# Patient Record
Sex: FEMALE | Race: WHITE | Employment: FULL TIME | URBAN - METROPOLITAN AREA
[De-identification: names, ages, dates, MRNs, and addresses within clinical notes are randomized per-mention and may not be internally consistent; named-entity substitution may affect disease eponyms.]

---

## 2017-05-30 ENCOUNTER — APPOINTMENT (OUTPATIENT)
Dept: GENERAL RADIOLOGY | Age: 52
End: 2017-05-30
Payer: COMMERCIAL

## 2017-05-30 ENCOUNTER — HOSPITAL ENCOUNTER (EMERGENCY)
Age: 52
Discharge: HOME | End: 2017-05-30
Attending: EMERGENCY MEDICINE
Payer: COMMERCIAL

## 2017-05-30 VITALS
DIASTOLIC BLOOD PRESSURE: 78 MMHG | TEMPERATURE: 98.7 F | BODY MASS INDEX: 16.22 KG/M2 | RESPIRATION RATE: 16 BRPM | SYSTOLIC BLOOD PRESSURE: 144 MMHG | HEIGHT: 68 IN | OXYGEN SATURATION: 99 % | WEIGHT: 107 LBS | HEART RATE: 92 BPM

## 2017-05-30 DIAGNOSIS — M79.642 HAND PAIN, LEFT: ICD-10-CM

## 2017-05-30 DIAGNOSIS — S61.512A WRIST LACERATION, LEFT, INITIAL ENCOUNTER: Primary | ICD-10-CM

## 2017-05-30 PROCEDURE — 74011250636 HC RX REV CODE- 250/636: Performed by: PHYSICIAN ASSISTANT

## 2017-05-30 PROCEDURE — 74011250637 HC RX REV CODE- 250/637: Performed by: PHYSICIAN ASSISTANT

## 2017-05-30 PROCEDURE — 73130 X-RAY EXAM OF HAND: CPT

## 2017-05-30 PROCEDURE — 99283 EMERGENCY DEPT VISIT LOW MDM: CPT | Performed by: PHYSICIAN ASSISTANT

## 2017-05-30 PROCEDURE — 75810000293 HC SIMP/SUPERF WND  RPR: Performed by: PHYSICIAN ASSISTANT

## 2017-05-30 PROCEDURE — 77030002916 HC SUT ETHLN J&J -A

## 2017-05-30 RX ORDER — OXYCODONE AND ACETAMINOPHEN 5; 325 MG/1; MG/1
1 TABLET ORAL
Status: COMPLETED | OUTPATIENT
Start: 2017-05-30 | End: 2017-05-30

## 2017-05-30 RX ORDER — CEPHALEXIN 500 MG/1
500 CAPSULE ORAL 3 TIMES DAILY
Qty: 30 CAP | Refills: 0 | Status: SHIPPED | OUTPATIENT
Start: 2017-05-30 | End: 2019-12-13 | Stop reason: SDUPTHER

## 2017-05-30 RX ORDER — HYDROCODONE BITARTRATE AND ACETAMINOPHEN 5; 325 MG/1; MG/1
1 TABLET ORAL
Qty: 12 TAB | Refills: 0 | Status: SHIPPED | OUTPATIENT
Start: 2017-05-30 | End: 2017-10-10

## 2017-05-30 RX ORDER — HYDROMORPHONE HYDROCHLORIDE 1 MG/ML
1 INJECTION, SOLUTION INTRAMUSCULAR; INTRAVENOUS; SUBCUTANEOUS
Status: COMPLETED | OUTPATIENT
Start: 2017-05-30 | End: 2017-05-30

## 2017-05-30 RX ORDER — IBUPROFEN 600 MG/1
600 TABLET ORAL
Qty: 20 TAB | Refills: 0 | Status: SHIPPED | OUTPATIENT
Start: 2017-05-30 | End: 2017-10-10

## 2017-05-30 RX ADMIN — OXYCODONE HYDROCHLORIDE AND ACETAMINOPHEN 1 TABLET: 5; 325 TABLET ORAL at 20:56

## 2017-05-30 RX ADMIN — HYDROMORPHONE HYDROCHLORIDE 1 MG: 1 INJECTION, SOLUTION INTRAMUSCULAR; INTRAVENOUS; SUBCUTANEOUS at 22:22

## 2017-05-30 NOTE — ED NOTES
\"I ordered some new silverware and I was using a knife to get the zip tie off and it slipped and got my wrist.  I got blood everywhere\"

## 2017-05-31 NOTE — DISCHARGE INSTRUCTIONS
Keep bandage on left wrist x 2 days at which point you will need a wound check. It is also very important that you follow up with the hand surgeon as you likely have an injury to the muscle of your left hand. Keep left hand elevated as much as possible. If you should have any change in your symptoms, worsening symptoms, or concerns return to the ER           Hand Pain: Care Instructions  Your Care Instructions  Common causes of hand pain are overuse and injuries, such as might happen during sports or home repair projects. Everyday wear and tear, especially as you get older, also can cause hand pain. Most minor hand injuries will heal on their own, and home treatment is usually all you need to do. If you have sudden and severe pain, you may need tests and treatment. Follow-up care is a key part of your treatment and safety. Be sure to make and go to all appointments, and call your doctor if you are having problems. Its also a good idea to know your test results and keep a list of the medicines you take. How can you care for yourself at home? · Take pain medicines exactly as directed. ¨ If the doctor gave you a prescription medicine for pain, take it as prescribed. ¨ If you are not taking a prescription pain medicine, ask your doctor if you can take an over-the-counter medicine. · Rest and protect your hand. Take a break from any activity that may cause pain. · Put ice or a cold pack on your hand for 10 to 20 minutes at a time. Put a thin cloth between the ice and your skin. · Prop up the sore hand on a pillow when you ice it or anytime you sit or lie down during the next 3 days. Try to keep it above the level of your heart. This will help reduce swelling. · If your doctor recommends a sling, splint, or elastic bandage to support your hand, wear it as directed. When should you call for help? Call 911 anytime you think you may need emergency care.  For example, call if:  · Your hand turns cool or pale or changes color. Call your doctor now or seek immediate medical care if:  · You cannot move your hand. · Your hand pops, moves out of its normal position, and then returns to its normal position. · You have signs of infection, such as:  ¨ Increased pain, swelling, warmth, or redness. ¨ Red streaks leading from the sore area. ¨ Pus draining from a place on your hand. ¨ A fever. · Your hand feels numb or tingly. Watch closely for changes in your health, and be sure to contact your doctor if:  · Your hand feels unstable when you try to use it. · You do not get better as expected. · You have any new symptoms, such as swelling. · Bruises from an injury to your hand last longer than 2 weeks. Where can you learn more? Go to http://josé miguel-awilda.info/. Enter R273 in the search box to learn more about \"Hand Pain: Care Instructions. \"  Current as of: May 27, 2016  Content Version: 11.2  © 4882-6865 [employer]. Care instructions adapted under license by [employer] (which disclaims liability or warranty for this information). If you have questions about a medical condition or this instruction, always ask your healthcare professional. [place] [address] any warranty or liability for your use of this information. Cuts Closed With Stitches: Care Instructions  Your Care Instructions  A cut can happen anywhere on your body. The doctor used stitches to close the cut. Using stitches also helps the cut heal and reduces scarring. Sometimes pieces of tape called Steri-Strips are put over the stitches. If the cut went deep and through the skin, the doctor may have put in two layers of stitches. The deeper layer brings the deep part of the cut together. These stitches will dissolve and don't need to be removed. The stitches in the upper layer are the ones you see on the cut. You will probably have a bandage over the stitches.  You will need to have the stitches removed, usually in 10 to 14 days. The doctor has checked you carefully, but problems can develop later. If you notice any problems or new symptoms, get medical treatment right away. Follow-up care is a key part of your treatment and safety. Be sure to make and go to all appointments, and call your doctor if you are having problems. It's also a good idea to know your test results and keep a list of the medicines you take. How can you care for yourself at home? · Keep the cut dry for the first 24 to 48 hours. After this, you can shower if your doctor okays it. Pat the cut dry. · Don't soak the cut, such as in a bathtub. Your doctor will tell you when it's safe to get the cut wet. · If your doctor told you how to care for your cut, follow your doctor's instructions. If you did not get instructions, follow this general advice:  ¨ After the first 24 to 48 hours, wash around the cut with clean water 2 times a day. Don't use hydrogen peroxide or alcohol, which can slow healing. ¨ You may cover the cut with a thin layer of petroleum jelly, such as Vaseline, and a nonstick bandage. ¨ Apply more petroleum jelly and replace the bandage as needed. · Prop up the sore area on a pillow anytime you sit or lie down during the next 3 days. Try to keep it above the level of your heart. This will help reduce swelling. · Avoid any activity that could cause your cut to reopen. · Do not remove the stitches on your own. Your doctor will tell you when to come back to have the stitches removed. · Leave Steri-Strips on until they fall off. · Be safe with medicines. Read and follow all instructions on the label. ¨ If the doctor gave you a prescription medicine for pain, take it as prescribed. ¨ If you are not taking a prescription pain medicine, ask your doctor if you can take an over-the-counter medicine. When should you call for help?   Call your doctor now or seek immediate medical care if:  · You have new pain, or your pain gets worse. · The skin near the cut is cold or pale or changes color. · You have tingling, weakness, or numbness near the cut. · The cut starts to bleed, and blood soaks through the bandage. Oozing small amounts of blood is normal.  · You have trouble moving the area near the cut. · You have symptoms of infection, such as:  ¨ Increased pain, swelling, warmth, or redness around the cut. ¨ Red streaks leading from the cut. ¨ Pus draining from the cut. ¨ A fever. Watch closely for changes in your health, and be sure to contact your doctor if:  · The cut reopens. · You do not get better as expected. Where can you learn more? Go to http://josé miguel-awilda.info/. Enter R217 in the search box to learn more about \"Cuts Closed With Stitches: Care Instructions. \"  Current as of: May 27, 2016  Content Version: 11.2  © 4012-3496 [employer]. Care instructions adapted under license by [employer] (which disclaims liability or warranty for this information). If you have questions about a medical condition or this instruction, always ask your healthcare professional. [place] [address] any warranty or liability for your use of this information.

## 2017-05-31 NOTE — ED PROVIDER NOTES
HPI Comments: 71-year-old  female presents stating that she was trying to cut out a plastic tie with a sharp new kitchen knife and accidentally jabbed her left wrist.  She states the knife went in and the direction of her left thumb and she has pain to her left thenar eminence as well. Patient states initially wound bled a lot but she states that bleeding has slowed down now. She denies any paresthesias. She is having difficulty moving her left hand which she states is due to pain. Patient is a 46 y.o. female presenting with skin laceration. The history is provided by the patient. Laceration    The incident occurred 1 to 2 hours ago. The laceration is located on the left hand. The laceration is 1 cm in size. The injury mechanism is a clean knife. Foreign body present: no. The pain is at a severity of 7/10. The pain has been constant since onset. Associated symptoms include loss of motion. Pertinent negatives include no numbness, no tingling, no weakness, no coolness and no discoloration. It is unknown when the patient last had a tetanus shot. History reviewed. No pertinent past medical history. Past Surgical History:   Procedure Laterality Date    ENDOSCOPY, COLON, DIAGNOSTIC  2013    normal (Dr. Gabriela Lim)    HX BREAST BIOPSY      fibrocystic '05    HX GYN  2005    hysterectomy ,left ovaries    HX HEENT      tonsilectomy    HARJINDER BIOPSY BREAST STEREOTACTIC Left 11/22/2016         Family History:   Problem Relation Age of Onset    Other Mother      macular degeneration    Cancer Maternal Aunt      breast 45s    Breast Cancer Maternal Aunt 52    Heart Disease Maternal Grandmother      CHF MI at age 61   [facility] Cancer Sister      vaginal cancer/squamous       Social History     Social History    Marital status: SINGLE     Spouse name: N/A    Number of children: N/A    Years of education: N/A     Occupational History    Not on file.      Social History Main Topics    Smoking status: Current Every Day Smoker     Packs/day: 1.00     Types: Cigarettes     Last attempt to quit: 2/8/2017    Smokeless tobacco: Never Used    Alcohol use 0.0 oz/week     0 Standard drinks or equivalent per week      Comment: occasional    Drug use: No    Sexual activity: Not on file     Other Topics Concern    Not on file     Social History Narrative         ALLERGIES: Toradol [ketorolac tromethamine]    Review of Systems   Constitutional: Negative for chills and fever. Gastrointestinal: Negative for nausea and vomiting. Musculoskeletal: Positive for arthralgias. Negative for joint swelling and myalgias. Skin: Positive for wound. Neurological: Negative for tingling, weakness and numbness. Vitals:    05/30/17 1945   BP: 144/79   Pulse: 98   Resp: 17   Temp: 98.7 °F (37.1 °C)   SpO2: 97%   Weight: 48.5 kg (107 lb)   Height: 5' 7.5\" (1.715 m)            Physical Exam   Constitutional: She is oriented to person, place, and time. Vital signs are normal. She appears well-developed and well-nourished. She is active. Non-toxic appearance. She does not appear ill. No distress. Patient is very anxious. Cardiovascular:   Pulses:       Radial pulses are 2+ on the right side, and 2+ on the left side. Musculoskeletal:        Left wrist: She exhibits decreased range of motion, tenderness, swelling and laceration. She exhibits no bony tenderness. Left forearm: Normal.        Arms:       Left hand: She exhibits tenderness and swelling. She exhibits normal range of motion, no bony tenderness, normal capillary refill and no laceration. Normal sensation noted. Normal strength noted. Hands:  Sensation intact to tips of all fingers of left hand. Neurological: She is alert and oriented to person, place, and time. No sensory deficit. Skin: Skin is warm. Psychiatric: Her speech is normal and behavior is normal. Her mood appears anxious. Nursing note and vitals reviewed.        MDM  Number of Diagnoses or Management Options  Hand pain, left: new and requires workup  Wrist laceration, left, initial encounter: new and requires workup  Diagnosis management comments: Imaging studies of left wrist do not reveal any acute bony abnormalities. Wound is repaired see procedure note. Patient is prescribed Keflex, Motrin and Norco.  Patient is also referred to hand surgery for follow-up. Patient is advised to return to the ER with any change in symptoms or worsening of symptoms. Amount and/or Complexity of Data Reviewed  Tests in the radiology section of CPT®: ordered and reviewed    Risk of Complications, Morbidity, and/or Mortality  Presenting problems: low  Diagnostic procedures: low  Management options: moderate    Patient Progress  Patient progress: improved    ED Course       Wound Repair  Date/Time: 5/30/2017 10:10 PM  Performed by: Dea Rendon provider: Maurice Ayon DO  Preparation: skin prepped with Betadine  Pre-procedure re-eval: Immediately prior to the procedure, the patient was reevaluated and found suitable for the planned procedure and any planned medications. Location details: left wrist  Wound length:2.5 cm or less (1.0 cm)  Anesthesia: local infiltration    Anesthesia:  Anesthesia: local infiltration  Local Anesthetic: lidocaine 1% without epinephrine   Anesthetic total: 4 mL  Foreign bodies: no foreign bodies  Irrigation solution: saline  Irrigation method: syringe  Debridement: none  Skin closure: 4-0 nylon  Number of sutures: 1  Technique: simple and interrupted  Approximation: close  Dressing: antibiotic ointment, 4x4 and gauze roll  Patient tolerance: Patient tolerated the procedure well with no immediate complications  My total time at bedside, performing this procedure was 1-15 minutes. XR HAND LT MIN 3 V   Final Result   IMPRESSION:  Normal left hand.                      I discussed the results of all labs, procedures, radiographs, and treatments with the patient and available family. Treatment plan is agreed upon and the patient is ready for discharge. Questions about treatment in the ED and differential diagnosis of presenting condition were answered. Patient was given verbal discharge instructions including, but not limited to, importance of returning to the emergency department for any concern of worsening or continued symptoms. Instructions were given to follow up with a primary care provider or specialist within 1-2 days. Adverse effects of medications, if prescribed, were discussed and patient was advised to refrain from significant physical activity until followed up by primary care physician and to not drive or operate heavy machinery after taking any sedating substances.

## 2017-10-17 ENCOUNTER — HOSPITAL ENCOUNTER (OUTPATIENT)
Dept: GENERAL RADIOLOGY | Age: 52
Discharge: HOME | End: 2017-10-17
Payer: COMMERCIAL

## 2017-10-17 DIAGNOSIS — R63.4 WEIGHT LOSS: ICD-10-CM

## 2017-10-17 PROCEDURE — 71020 XR CHEST PA LAT: CPT

## 2017-11-22 ENCOUNTER — HOSPITAL ENCOUNTER (OUTPATIENT)
Dept: LAB | Age: 52
Discharge: HOME | End: 2017-11-22

## 2017-11-22 PROCEDURE — 88305 TISSUE EXAM BY PATHOLOGIST: CPT | Performed by: INTERNAL MEDICINE

## 2017-12-23 ENCOUNTER — APPOINTMENT (OUTPATIENT)
Dept: GENERAL RADIOLOGY | Age: 52
DRG: 917 | End: 2017-12-23
Attending: EMERGENCY MEDICINE
Payer: SUBSIDIZED

## 2017-12-23 ENCOUNTER — APPOINTMENT (OUTPATIENT)
Dept: CT IMAGING | Age: 52
DRG: 917 | End: 2017-12-23
Attending: EMERGENCY MEDICINE
Payer: SUBSIDIZED

## 2017-12-23 ENCOUNTER — HOSPITAL ENCOUNTER (INPATIENT)
Age: 52
LOS: 4 days | Discharge: HOME | DRG: 917 | End: 2017-12-28
Attending: EMERGENCY MEDICINE | Admitting: INTERNAL MEDICINE
Payer: SUBSIDIZED

## 2017-12-23 DIAGNOSIS — R41.82 ALTERED MENTAL STATUS, UNSPECIFIED ALTERED MENTAL STATUS TYPE: Primary | ICD-10-CM

## 2017-12-23 DIAGNOSIS — T50.902A MEDICATION OVERDOSE, INTENTIONAL SELF-HARM, INITIAL ENCOUNTER (HCC): ICD-10-CM

## 2017-12-23 DIAGNOSIS — F10.929 ALCOHOLIC INTOXICATION WITH COMPLICATION (HCC): ICD-10-CM

## 2017-12-23 LAB
ARTERIAL PATENCY WRIST A: POSITIVE
BASE DEFICIT BLDA-SCNC: 3.8 MMOL/L (ref 0–2)
BDY SITE: ABNORMAL
COHGB MFR BLD: 4.1 % (ref 0.5–1.5)
DO-HGB BLD-MCNC: 1 % (ref 0–5)
FIO2 ON VENT: 60 %
HCO3 BLDA-SCNC: 22 MMOL/L (ref 22–26)
HGB BLDMV-MCNC: 12.7 GM/DL (ref 11.7–15)
METHGB MFR BLD: 0.4 % (ref 0–1.5)
OXYHGB MFR BLDA: 94.7 % (ref 94–97)
PCO2 BLDA: 46 MMHG (ref 35–45)
PEEP RESPIRATORY: 8 CM[H2O]
PH BLDA: 7.31 [PH] (ref 7.35–7.45)
PO2 BLDA: 307 MMHG (ref 80–105)
SAO2 % BLD: 99 % (ref 92–98.5)
SERVICE CMNT-IMP: ABNORMAL
TROPONIN I BLD-MCNC: 0 NG/ML (ref 0.02–0.05)
VENTILATION MODE VENT: ABNORMAL
VT SETTING VENT: 450 ML

## 2017-12-23 PROCEDURE — 96375 TX/PRO/DX INJ NEW DRUG ADDON: CPT | Performed by: EMERGENCY MEDICINE

## 2017-12-23 PROCEDURE — 71010 XR CHEST PORT: CPT

## 2017-12-23 PROCEDURE — 74011250636 HC RX REV CODE- 250/636

## 2017-12-23 PROCEDURE — 99285 EMERGENCY DEPT VISIT HI MDM: CPT | Performed by: EMERGENCY MEDICINE

## 2017-12-23 PROCEDURE — 80307 DRUG TEST PRSMV CHEM ANLYZR: CPT | Performed by: EMERGENCY MEDICINE

## 2017-12-23 PROCEDURE — 77030008771 HC TU NG SALEM SUMP -A

## 2017-12-23 PROCEDURE — 74011250636 HC RX REV CODE- 250/636: Performed by: EMERGENCY MEDICINE

## 2017-12-23 PROCEDURE — 85025 COMPLETE CBC W/AUTO DIFF WBC: CPT | Performed by: EMERGENCY MEDICINE

## 2017-12-23 PROCEDURE — 80053 COMPREHEN METABOLIC PANEL: CPT | Performed by: EMERGENCY MEDICINE

## 2017-12-23 PROCEDURE — 74011000250 HC RX REV CODE- 250: Performed by: EMERGENCY MEDICINE

## 2017-12-23 PROCEDURE — 36600 WITHDRAWAL OF ARTERIAL BLOOD: CPT

## 2017-12-23 PROCEDURE — 83690 ASSAY OF LIPASE: CPT | Performed by: EMERGENCY MEDICINE

## 2017-12-23 PROCEDURE — 94002 VENT MGMT INPAT INIT DAY: CPT

## 2017-12-23 PROCEDURE — 96361 HYDRATE IV INFUSION ADD-ON: CPT | Performed by: EMERGENCY MEDICINE

## 2017-12-23 PROCEDURE — 96365 THER/PROPH/DIAG IV INF INIT: CPT | Performed by: EMERGENCY MEDICINE

## 2017-12-23 PROCEDURE — 31500 INSERT EMERGENCY AIRWAY: CPT | Performed by: EMERGENCY MEDICINE

## 2017-12-23 PROCEDURE — 84484 ASSAY OF TROPONIN QUANT: CPT

## 2017-12-23 PROCEDURE — 5A1935Z RESPIRATORY VENTILATION, LESS THAN 24 CONSECUTIVE HOURS: ICD-10-PCS | Performed by: INTERNAL MEDICINE

## 2017-12-23 PROCEDURE — 93005 ELECTROCARDIOGRAM TRACING: CPT | Performed by: EMERGENCY MEDICINE

## 2017-12-23 PROCEDURE — 82803 BLOOD GASES ANY COMBINATION: CPT

## 2017-12-23 RX ORDER — VECURONIUM BROMIDE FOR INJECTION 1 MG/ML
0.1 INJECTION, POWDER, LYOPHILIZED, FOR SOLUTION INTRAVENOUS
Status: COMPLETED | OUTPATIENT
Start: 2017-12-23 | End: 2017-12-23

## 2017-12-23 RX ORDER — PROPOFOL 10 MG/ML
INJECTION, EMULSION INTRAVENOUS
Status: COMPLETED
Start: 2017-12-23 | End: 2017-12-23

## 2017-12-23 RX ORDER — SUCCINYLCHOLINE CHLORIDE 20 MG/ML
50 INJECTION INTRAMUSCULAR; INTRAVENOUS
Status: COMPLETED | OUTPATIENT
Start: 2017-12-23 | End: 2017-12-23

## 2017-12-23 RX ORDER — SODIUM CHLORIDE 9 MG/ML
1000 INJECTION, SOLUTION INTRAVENOUS CONTINUOUS
Status: DISCONTINUED | OUTPATIENT
Start: 2017-12-23 | End: 2017-12-28 | Stop reason: HOSPADM

## 2017-12-23 RX ORDER — PROPOFOL 10 MG/ML
0-50 VIAL (ML) INTRAVENOUS
Status: DISCONTINUED | OUTPATIENT
Start: 2017-12-23 | End: 2017-12-26

## 2017-12-23 RX ORDER — NALOXONE HYDROCHLORIDE 1 MG/ML
2 INJECTION INTRAMUSCULAR; INTRAVENOUS; SUBCUTANEOUS
Status: COMPLETED | OUTPATIENT
Start: 2017-12-23 | End: 2017-12-23

## 2017-12-23 RX ORDER — PROPOFOL 10 MG/ML
0.5 INJECTION, EMULSION INTRAVENOUS
Status: DISCONTINUED | OUTPATIENT
Start: 2017-12-23 | End: 2017-12-23

## 2017-12-23 RX ORDER — WATER FOR INJECTION,STERILE
VIAL (ML) INJECTION
Status: ACTIVE
Start: 2017-12-23 | End: 2017-12-24

## 2017-12-23 RX ORDER — SUCCINYLCHOLINE CHLORIDE 20 MG/ML
100 INJECTION INTRAMUSCULAR; INTRAVENOUS
Status: COMPLETED | OUTPATIENT
Start: 2017-12-23 | End: 2017-12-23

## 2017-12-23 RX ORDER — NALOXONE HYDROCHLORIDE 1 MG/ML
INJECTION INTRAMUSCULAR; INTRAVENOUS; SUBCUTANEOUS
Status: COMPLETED
Start: 2017-12-23 | End: 2017-12-23

## 2017-12-23 RX ORDER — ETOMIDATE 2 MG/ML
30 INJECTION INTRAVENOUS ONCE
Status: COMPLETED | OUTPATIENT
Start: 2017-12-23 | End: 2017-12-23

## 2017-12-23 RX ADMIN — SUCCINYLCHOLINE CHLORIDE 100 MG: 20 INJECTION, SOLUTION INTRAMUSCULAR; INTRAVENOUS at 22:45

## 2017-12-23 RX ADMIN — ETOMIDATE 30 MG: 2 INJECTION, SOLUTION INTRAVENOUS at 22:43

## 2017-12-23 RX ADMIN — PROPOFOL 10 MCG/KG/MIN: 10 INJECTION, EMULSION INTRAVENOUS at 23:16

## 2017-12-23 RX ADMIN — NALOXONE HYDROCHLORIDE 2 MG: 1 INJECTION INTRAMUSCULAR; INTRAVENOUS; SUBCUTANEOUS at 22:24

## 2017-12-23 RX ADMIN — VECURONIUM BROMIDE 4.45 MG: 10 INJECTION, POWDER, LYOPHILIZED, FOR SOLUTION INTRAVENOUS at 23:32

## 2017-12-23 RX ADMIN — SUCCINYLCHOLINE CHLORIDE 50 MG: 20 INJECTION, SOLUTION INTRAMUSCULAR; INTRAVENOUS at 22:59

## 2017-12-23 RX ADMIN — SODIUM CHLORIDE 1000 ML: 900 INJECTION, SOLUTION INTRAVENOUS at 22:28

## 2017-12-23 RX ADMIN — POISON ADSORBENT 50 G: 50 SUSPENSION ORAL at 23:33

## 2017-12-23 RX ADMIN — NALOXONE HYDROCHLORIDE 2 MG: 1 INJECTION PARENTERAL at 22:24

## 2017-12-23 NOTE — IP AVS SNAPSHOT
[address] [facility], [place] 
 
 
 [address] [facility] [address] [facility] 
[phone] Patient: Aishwarya Strong MRN: [id number] :1965 About your hospitalization You were admitted on:  2017 You last received care in the:  [facility] 8 MED SURG You were discharged on:  2017 Why you were hospitalized Your primary diagnosis was: Altered Mental Status Your diagnoses also included:  Suicidal Overdose (Hcc), Alcohol Intoxication (Hcc), Protein-Calorie Malnutrition, Severe (Hcc) Things You Need To Do (next 8 weeks) Call [facility] EToH/SA treatment  today Where:  Kirk Aguirre [address] [place], [place]  
  
 Call [facility] today  
for f/u Phone:  [phone] Where:  [address] [address] Go to [address] [place] [address] in 3 day(s) Rx; [facility] dental care Phone:  [phone] Where:  Francesca Gorman [place] [address]  Extended Office Visit with Nely Shell MD at  2:30 PM  
Where:  [address] [facility] ([address]) Discharge Orders None A check amada indicates which time of day the medication should be taken. My Medications TAKE these medications as instructed Instructions Each Dose to Equal  
 Morning Noon Evening Bedtime  
 aspirin delayed-release 81 mg tablet Your next dose is:   Take  by mouth daily. ciprofloxacin HCl 500 mg tablet Commonly known as:  CIPRO Your next dose is: This evening Take 1 Tab by mouth two (2) times a day for 7 days. 500 mg  
    
   
   
  
   
  
 mirtazapine 15 mg tablet Commonly known as:  Conssly Bangura Your next dose is:  bedtime Take 1 Tab by mouth nightly. 15 mg  
    
   
   
   
  
  
 nicotine 14 mg/24 hr patch Commonly known as:  Vladimir Pascal  
 Start taking on:  12/29/2017 1 Patch by TransDERmal route daily for 21 days. 1 Patch  
    
   
   
   
  
 zolpidem 10 mg tablet Commonly known as:  AMBIEN Your next dose is:  bedtime Take 1/2 tablet at bedtime as needed Where to Get Your Medications Information on where to get these meds will be given to you by the nurse or doctor. ! Ask your nurse or doctor about these medications  
  ciprofloxacin HCl 500 mg tablet  
 mirtazapine 15 mg tablet  
 nicotine 14 mg/24 hr patch Discharge Instructions DISCHARGE SUMMARY from Nurse PATIENT INSTRUCTIONS: 
 
After general anesthesia or intravenous sedation, for 24 hours or while taking prescription Narcotics: · Limit your activities · Do not drive and operate hazardous machinery · Do not make important personal or business decisions · Do  not drink alcoholic beverages · If you have not urinated within 8 hours after discharge, please contact your surgeon on call. Report the following to your surgeon: 
· Excessive pain, swelling, redness or odor of or around the surgical area · Temperature over 100.5 · Nausea and vomiting lasting longer than 4 hours or if unable to take medications · Any signs of decreased circulation or nerve impairment to extremity: change in color, persistent  numbness, tingling, coldness or increase pain · Any questions What to do at Home: 
Recommended activity: Activity as tolerated If you experience any of the following symptoms fever greater than 101, nausea/vomiting, or pain , please follow up with your primary care physician. *  Please give a list of your current medications to your Primary Care Provider. *  Please update this list whenever your medications are discontinued, doses are 
    changed, or new medications (including over-the-counter products) are added.  
 
*  Please carry medication information at all times in case of emergency situations. These are general instructions for a healthy lifestyle: No smoking/ No tobacco products/ Avoid exposure to second hand smoke Surgeon General's Warning:  Quitting smoking now greatly reduces serious risk to your health. Obesity, smoking, and sedentary lifestyle greatly increases your risk for illness A healthy diet, regular physical exercise & weight monitoring are important for maintaining a healthy lifestyle You may be retaining fluid if you have a history of heart failure or if you experience any of the following symptoms:  Weight gain of 3 pounds or more overnight or 5 pounds in a week, increased swelling in our hands or feet or shortness of breath while lying flat in bed. Please call your doctor as soon as you notice any of these symptoms; do not wait until your next office visit. Recognize signs and symptoms of STROKE: 
 
F-face looks uneven A-arms unable to move or move unevenly S-speech slurred or non-existent T-time-call 911 as soon as signs and symptoms begin-DO NOT go Back to bed or wait to see if you get better-TIME IS BRAIN. Warning Signs of HEART ATTACK Call 911 if you have these symptoms: 
? Chest discomfort. Most heart attacks involve discomfort in the center of the chest that lasts more than a few minutes, or that goes away and comes back. It can feel like uncomfortable pressure, squeezing, fullness, or pain. ? Discomfort in other areas of the upper body. Symptoms can include pain or discomfort in one or both arms, the back, neck, jaw, or stomach. ? Shortness of breath with or without chest discomfort. ? Other signs may include breaking out in a cold sweat, nausea, or lightheadedness. Don't wait more than five minutes to call [address]! Fast action can save your life. Calling 911 is almost always the fastest way to get lifesaving treatment.  Emergency Medical Services staff can begin treatment when they arrive  up to an hour sooner than if someone gets to the hospital by car. The discharge information has been reviewed with the patient. The patient verbalized understanding. Discharge medications reviewed with the patient and appropriate educational materials and side effects teaching were provided. ___________________________________________________________________________________________________________________________________ Altered Mental Status: Care Instructions Your Care Instructions Altered mental status is a change in how well your brain is working. As a result, you may be confused, be less alert than usual, or act in odd ways. This may include seeing or hearing things that aren't really there (hallucinations). A mental status change has many possible causes. For example, it may be the result of an infection, an imbalance of chemicals in the body, or a chronic disease such as diabetes or COPD. It can also be caused by things such as a head injury, taking certain medicines, or using alcohol or drugs. The doctor may do tests to look for the cause. These tests may include urine tests, blood tests, and imaging tests such as a CT scan. Sometimes a clear cause isn't found. But tests can help the doctor rule out a serious cause of your symptoms. A change in mental status can be scary. But mental status will often return to normal when the cause is treated. So it is important to get any follow-up testing or treatment the doctor has suggested. The doctor has checked you carefully, but problems can develop later. If you notice any problems or new symptoms, get medical treatment right away. Follow-up care is a key part of your treatment and safety. Be sure to make and go to all appointments, and call your doctor if you are having problems. It's also a good idea to know your test results and keep a list of the medicines you take. How can you care for yourself at home? · Be safe with medicines. Take your medicines exactly as prescribed. Call your doctor if you think you are having a problem with your medicine. · Have another adult stay with you until you are better. This can help keep you safe. Ask that person to watch for signs that your mental status is getting worse. When should you call for help? Call 911 anytime you think you may need emergency care. For example, call if: 
? · You passed out (lost consciousness). ?Call your doctor now or seek immediate medical care if: 
? · Your mental status is getting worse. ? · You have new symptoms, such as a fever, chills, or shortness of breath. ? · You do not feel safe. ? Watch closely for changes in your health, and be sure to contact your doctor if: 
? · You do not get better as expected. Where can you learn more? Go to http://josé miguelNetbyte Hostingawilda.info/. Enter S037 in the search box to learn more about \"Altered Mental Status: Care Instructions. \" Current as of: October 14, 2016 Content Version: 11.4 © 2153-9119 [employer]. Care instructions adapted under license by [employer] (which disclaims liability or warranty for this information). If you have questions about a medical condition or this instruction, always ask your healthcare professional. [address] any warranty or liability for your use of this information. [employer]X Announcement We are excited to announce that we are making your provider's discharge notes available to you in PureWRX. You will see these notes when they are completed and signed by the physician that discharged you from your recent hospital stay. If you have any questions or concerns about any information you see in PureWRX, please call the Health Information Department where you were seen or reach out to your Primary Care Provider for more information about your plan of care. Introducing [facility] SERVICES! [employer] introduces Artsicle patient portal. Now you can access parts of your medical record, email your doctor's office, and request medication refills online. 1. In your internet browser, go to [email or web address] 2. Click on the First Time User? Click Here link in the Sign In box. You will see the New Member Sign Up page. 3. Enter your Artsicle Access Code exactly as it appears below. You will not need to use this code after youve completed the sign-up process. If you do not sign up before the expiration date, you must request a new code. · Artsicle Access Code: [email or web address] Expires: 1/8/2018  2:10 PM 
 
4. Enter the last four digits of your Social Security Number (xxxx) and Date of Birth (mm/dd/yyyy) as indicated and click Submit. You will be taken to the next sign-up page. 5. Create a Artsicle ID. This will be your Artsicle login ID and cannot be changed, so think of one that is secure and easy to remember. 6. Create a Artsicle password. You can change your password at any time. 7. Enter your Password Reset Question and Answer. This can be used at a later time if you forget your password. 8. Enter your e-mail address. You will receive e-mail notification when new information is available in [address] 9. Click Sign Up. You can now view and download portions of your medical record. 10. Click the Download Summary menu link to download a portable copy of your medical information. If you have questions, please visit the Frequently Asked Questions section of the Artsicle website. Remember, Artsicle is NOT to be used for urgent needs. For medical emergencies, dial 911. Now available from your iPhone and Android! Unresulted Labs-Please follow up with your PCP about these lab tests Order Current Status CULTURE, BLOOD Preliminary result CULTURE, BLOOD Preliminary result Providers Seen During Your Hospitalization Provider Specialty Primary office phone Mirian Contreras MD Emergency Medicine [phone] Tolu Singleton MD Pulmonary Disease [phone] Aris Aldrich MD Internal Medicine [phone] Immunizations Administered for This Admission Name Date Influenza Vaccine (Quad) PF 12/28/2017 Your Primary Care Physician (PCP) Primary Care Physician Office Phone Office Fax [address] Landy Smith, Adalberto1 Ishan Dooley omar. [phone] You are allergic to the following Allergen Reactions Effexor (Venlafaxine) Other (comments) Too much\" could not fcn Toradol (Ketorolac Tromethamine) Itching Recent Documentation Height Weight BMI OB Status Smoking Status 1.715 m 44.4 kg 15.11 kg/m2 Hysterectomy Current Some Day Smoker Emergency Contacts Name Discharge Info Relation Home Work Mobile Abbey Newby  Mother [14] [phone] Abel Bush  Boyfriend [17] [phone] Patient Belongings The following personal items are in your possession at time of discharge: 
  Dental Appliances: Partials, Uppers  Visual Aid: Glasses, With patient Please provide this summary of care documentation to your next provider. Signatures-by signing, you are acknowledging that this After Visit Summary has been reviewed with you and you have received a copy. Patient Signature:  ____________________________________________________________ Date:  ____________________________________________________________  
  
Leia Traylor Provider Signature:  ____________________________________________________________ Date:  ____________________________________________________________

## 2017-12-24 ENCOUNTER — APPOINTMENT (OUTPATIENT)
Dept: CT IMAGING | Age: 52
DRG: 917 | End: 2017-12-24
Attending: EMERGENCY MEDICINE
Payer: SUBSIDIZED

## 2017-12-24 ENCOUNTER — APPOINTMENT (OUTPATIENT)
Dept: CT IMAGING | Age: 52
DRG: 917 | End: 2017-12-24
Attending: INTERNAL MEDICINE
Payer: SUBSIDIZED

## 2017-12-24 PROBLEM — F10.929 ALCOHOL INTOXICATION (HCC): Status: ACTIVE | Noted: 2017-12-24

## 2017-12-24 PROBLEM — T50.902A SUICIDAL OVERDOSE (HCC): Status: ACTIVE | Noted: 2017-12-24

## 2017-12-24 PROBLEM — R41.82 ALTERED MENTAL STATUS: Status: ACTIVE | Noted: 2017-12-24

## 2017-12-24 LAB
ALBUMIN SERPL-MCNC: 3.3 G/DL (ref 3.5–5)
ALBUMIN/GLOB SERPL: 1 {RATIO} (ref 1.2–3.5)
ALP SERPL-CCNC: 75 U/L (ref 50–136)
ALT SERPL-CCNC: 18 U/L (ref 12–65)
AMPHET UR QL SCN: NEGATIVE
ANION GAP SERPL CALC-SCNC: 9 MMOL/L (ref 7–16)
APAP SERPL-MCNC: <2 UG/ML (ref 10–30)
ARTERIAL PATENCY WRIST A: POSITIVE
AST SERPL-CCNC: 13 U/L (ref 15–37)
ATRIAL RATE: 74 BPM
BARBITURATES UR QL SCN: NEGATIVE
BASE EXCESS BLDA CALC-SCNC: 0 MMOL/L (ref 0–3)
BASOPHILS # BLD: 0 K/UL (ref 0–0.2)
BASOPHILS NFR BLD: 0 % (ref 0–2)
BDY SITE: ABNORMAL
BENZODIAZ UR QL: NEGATIVE
BILIRUB SERPL-MCNC: 0.3 MG/DL (ref 0.2–1.1)
BUN SERPL-MCNC: 7 MG/DL (ref 6–23)
CALCIUM SERPL-MCNC: 7.5 MG/DL (ref 8.3–10.4)
CALCULATED P AXIS, ECG09: 86 DEGREES
CALCULATED R AXIS, ECG10: 92 DEGREES
CALCULATED T AXIS, ECG11: 77 DEGREES
CANNABINOIDS UR QL SCN: NEGATIVE
CHLORIDE SERPL-SCNC: 110 MMOL/L (ref 98–107)
CO2 SERPL-SCNC: 25 MMOL/L (ref 21–32)
COCAINE UR QL SCN: NEGATIVE
COHGB MFR BLD: 1.5 % (ref 0.5–1.5)
CREAT SERPL-MCNC: 0.41 MG/DL (ref 0.6–1)
DIAGNOSIS, 93000: NORMAL
DIFFERENTIAL METHOD BLD: ABNORMAL
DO-HGB BLD-MCNC: 1 % (ref 0–5)
EOSINOPHIL # BLD: 0.1 K/UL (ref 0–0.8)
EOSINOPHIL NFR BLD: 2 % (ref 0.5–7.8)
ERYTHROCYTE [DISTWIDTH] IN BLOOD BY AUTOMATED COUNT: 12.4 % (ref 11.9–14.6)
ETHANOL SERPL-MCNC: 198 MG/DL
FIO2 ON VENT: 60 %
GLOBULIN SER CALC-MCNC: 3.3 G/DL (ref 2.3–3.5)
GLUCOSE SERPL-MCNC: 97 MG/DL (ref 65–100)
HCO3 BLDA-SCNC: 26 MMOL/L (ref 22–26)
HCT VFR BLD AUTO: 35.3 % (ref 35.8–46.3)
HGB BLD-MCNC: 12 G/DL (ref 11.7–15.4)
HGB BLDMV-MCNC: 13.2 GM/DL (ref 11.7–15)
IMM GRANULOCYTES # BLD: 0 K/UL (ref 0–0.5)
IMM GRANULOCYTES NFR BLD AUTO: 0 % (ref 0–5)
LIPASE SERPL-CCNC: 300 U/L (ref 73–393)
LYMPHOCYTES # BLD: 2.1 K/UL (ref 0.5–4.6)
LYMPHOCYTES NFR BLD: 26 % (ref 13–44)
MCH RBC QN AUTO: 32.8 PG (ref 26.1–32.9)
MCHC RBC AUTO-ENTMCNC: 34 G/DL (ref 31.4–35)
MCV RBC AUTO: 96.4 FL (ref 79.6–97.8)
METHADONE UR QL: NEGATIVE
METHGB MFR BLD: 0.7 % (ref 0–1.5)
MONOCYTES # BLD: 0.9 K/UL (ref 0.1–1.3)
MONOCYTES NFR BLD: 10 % (ref 4–12)
NEUTS SEG # BLD: 5.1 K/UL (ref 1.7–8.2)
NEUTS SEG NFR BLD: 62 % (ref 43–78)
OPIATES UR QL: NEGATIVE
OXYHGB MFR BLDA: 96.8 % (ref 94–97)
P-R INTERVAL, ECG05: 166 MS
PCO2 BLDA: 45 MMHG (ref 35–45)
PCP UR QL: NEGATIVE
PH BLDA: 7.37 [PH] (ref 7.35–7.45)
PLATELET # BLD AUTO: 270 K/UL (ref 150–450)
PMV BLD AUTO: 10.2 FL (ref 10.8–14.1)
PO2 BLDA: 159 MMHG (ref 80–105)
POTASSIUM SERPL-SCNC: 3.8 MMOL/L (ref 3.5–5.1)
PROT SERPL-MCNC: 6.6 G/DL (ref 6.3–8.2)
Q-T INTERVAL, ECG07: 418 MS
QRS DURATION, ECG06: 74 MS
QTC CALCULATION (BEZET), ECG08: 463 MS
RBC # BLD AUTO: 3.66 M/UL (ref 4.05–5.25)
SALICYLATES SERPL-MCNC: 3.4 MG/DL (ref 2.8–20)
SAO2 % BLD: 99 % (ref 92–98.5)
SODIUM SERPL-SCNC: 144 MMOL/L (ref 136–145)
VENTRICULAR RATE, ECG03: 74 BPM
WBC # BLD AUTO: 8.2 K/UL (ref 4.3–11.1)

## 2017-12-24 PROCEDURE — 87186 SC STD MICRODIL/AGAR DIL: CPT | Performed by: INTERNAL MEDICINE

## 2017-12-24 PROCEDURE — 36600 WITHDRAWAL OF ARTERIAL BLOOD: CPT

## 2017-12-24 PROCEDURE — 87086 URINE CULTURE/COLONY COUNT: CPT | Performed by: INTERNAL MEDICINE

## 2017-12-24 PROCEDURE — 36415 COLL VENOUS BLD VENIPUNCTURE: CPT | Performed by: INTERNAL MEDICINE

## 2017-12-24 PROCEDURE — 65270000029 HC RM PRIVATE

## 2017-12-24 PROCEDURE — 87040 BLOOD CULTURE FOR BACTERIA: CPT | Performed by: INTERNAL MEDICINE

## 2017-12-24 PROCEDURE — 70450 CT HEAD/BRAIN W/O DYE: CPT

## 2017-12-24 PROCEDURE — 80307 DRUG TEST PRSMV CHEM ANLYZR: CPT | Performed by: EMERGENCY MEDICINE

## 2017-12-24 PROCEDURE — 96366 THER/PROPH/DIAG IV INF ADDON: CPT | Performed by: EMERGENCY MEDICINE

## 2017-12-24 PROCEDURE — 77030032490 HC SLV COMPR SCD KNE COVD -B

## 2017-12-24 PROCEDURE — 87088 URINE BACTERIA CULTURE: CPT | Performed by: INTERNAL MEDICINE

## 2017-12-24 PROCEDURE — 74011000250 HC RX REV CODE- 250: Performed by: INTERNAL MEDICINE

## 2017-12-24 PROCEDURE — 74011250636 HC RX REV CODE- 250/636: Performed by: EMERGENCY MEDICINE

## 2017-12-24 PROCEDURE — 82803 BLOOD GASES ANY COMBINATION: CPT

## 2017-12-24 PROCEDURE — 74011250637 HC RX REV CODE- 250/637: Performed by: INTERNAL MEDICINE

## 2017-12-24 PROCEDURE — 74011250636 HC RX REV CODE- 250/636: Performed by: INTERNAL MEDICINE

## 2017-12-24 RX ORDER — SODIUM CHLORIDE 9 MG/ML
100 INJECTION, SOLUTION INTRAVENOUS CONTINUOUS
Status: DISCONTINUED | OUTPATIENT
Start: 2017-12-24 | End: 2017-12-28 | Stop reason: HOSPADM

## 2017-12-24 RX ORDER — ACETAMINOPHEN 325 MG/1
650 TABLET ORAL
Status: DISCONTINUED | OUTPATIENT
Start: 2017-12-24 | End: 2017-12-25

## 2017-12-24 RX ORDER — PROPOFOL 10 MG/ML
0.5 INJECTION, EMULSION INTRAVENOUS
Status: COMPLETED | OUTPATIENT
Start: 2017-12-24 | End: 2017-12-24

## 2017-12-24 RX ORDER — IBUPROFEN 200 MG
1 TABLET ORAL DAILY
Status: COMPLETED | OUTPATIENT
Start: 2017-12-24 | End: 2017-12-27

## 2017-12-24 RX ORDER — CHLORHEXIDINE GLUCONATE 1.2 MG/ML
15 RINSE ORAL EVERY 12 HOURS
Status: DISCONTINUED | OUTPATIENT
Start: 2017-12-24 | End: 2017-12-24

## 2017-12-24 RX ORDER — SODIUM CHLORIDE 0.9 % (FLUSH) 0.9 %
5-10 SYRINGE (ML) INJECTION EVERY 8 HOURS
Status: DISCONTINUED | OUTPATIENT
Start: 2017-12-24 | End: 2017-12-28 | Stop reason: HOSPADM

## 2017-12-24 RX ORDER — SODIUM CHLORIDE 0.9 % (FLUSH) 0.9 %
5-10 SYRINGE (ML) INJECTION AS NEEDED
Status: DISCONTINUED | OUTPATIENT
Start: 2017-12-24 | End: 2017-12-28 | Stop reason: HOSPADM

## 2017-12-24 RX ORDER — ZOLPIDEM TARTRATE 5 MG/1
5 TABLET ORAL
Status: DISCONTINUED | OUTPATIENT
Start: 2017-12-24 | End: 2017-12-28 | Stop reason: HOSPADM

## 2017-12-24 RX ORDER — FENTANYL CITRATE-0.9 % NACL/PF 25 MCG/ML
0-200 PLASTIC BAG, INJECTION (ML) INJECTION
Status: DISCONTINUED | OUTPATIENT
Start: 2017-12-24 | End: 2017-12-26

## 2017-12-24 RX ORDER — ENOXAPARIN SODIUM 100 MG/ML
30 INJECTION SUBCUTANEOUS EVERY 24 HOURS
Status: DISCONTINUED | OUTPATIENT
Start: 2017-12-24 | End: 2017-12-28 | Stop reason: HOSPADM

## 2017-12-24 RX ADMIN — ACETAMINOPHEN 650 MG: 325 TABLET ORAL at 19:57

## 2017-12-24 RX ADMIN — Medication 5 ML: at 15:03

## 2017-12-24 RX ADMIN — PROPOFOL 22.3 MG: 10 INJECTION, EMULSION INTRAVENOUS at 01:44

## 2017-12-24 RX ADMIN — SODIUM CHLORIDE 100 ML/HR: 900 INJECTION, SOLUTION INTRAVENOUS at 14:57

## 2017-12-24 RX ADMIN — SODIUM CHLORIDE 100 ML/HR: 900 INJECTION, SOLUTION INTRAVENOUS at 03:39

## 2017-12-24 RX ADMIN — ZOLPIDEM TARTRATE 5 MG: 5 TABLET ORAL at 23:51

## 2017-12-24 RX ADMIN — Medication 5 ML: at 21:20

## 2017-12-24 RX ADMIN — FOLIC ACID: 5 INJECTION, SOLUTION INTRAMUSCULAR; INTRAVENOUS; SUBCUTANEOUS at 03:51

## 2017-12-24 RX ADMIN — PROPOFOL 30 MCG/KG/MIN: 10 INJECTION, EMULSION INTRAVENOUS at 05:57

## 2017-12-24 RX ADMIN — Medication 1 AMPULE: at 21:19

## 2017-12-24 RX ADMIN — Medication 1 AMPULE: at 09:13

## 2017-12-24 RX ADMIN — ENOXAPARIN SODIUM 30 MG: 30 INJECTION SUBCUTANEOUS at 09:13

## 2017-12-24 RX ADMIN — Medication 50 MCG/HR: at 03:26

## 2017-12-24 NOTE — PROGRESS NOTES
Respiratory Mechanics completed and are as follows:  Weaning Parameters  Spontaneous Breathing Trial Complete:  (PS for 10 minutes eyes open & writing on tablet)  Resp Rate Observed: 18  Ve: 8.1  VT: 450  RSBI: 25  NIF: -25  VC: 890  Genao Agitation Sedation Scale (RASS): Alert and calm  Patient extubated to a 2L NC. Patient is able to communicate and is negative for stridor. Breath sounds are coarse. No complications with extubation.      Nayana Adkins

## 2017-12-24 NOTE — ED TRIAGE NOTES
ambien and white wine. Withdraws to pain. No resp. Distress noted. Moved to room 3 for  BP 69/44  Dr. La Lopez at bedside.

## 2017-12-24 NOTE — PROGRESS NOTES
Admission skin assessment completed with second RN and reveals the following: patients skin is warm and dry, small scab noted to left shin, multiple tattoos. Heels are intact, sacrum is intact. Patient is very thin, allevyn placed to sacrum for prevention of skin breakdown. Otherwise no skin issues.

## 2017-12-24 NOTE — ED NOTES
Pt in room and having apneic episodes. Pt unresponsive. Dr. Jose D Welch called to bedside. Per daughter pt was drinking white wine and they think she took an unknown amount of unknown pills. Daughter states pt has been emotional here lately because of a recent break up. Pt has not verbalized suicidal thoughts per daughter and it is believed at this time that the overdose was accidental. Last time pt used her cell phone was around 2030 this evening.

## 2017-12-24 NOTE — PROGRESS NOTES
Patient is agitated and  pulled out NGT and attempted to removed ETT. Restraints checked and propofol gtt increased.

## 2017-12-24 NOTE — PROGRESS NOTES
Call placed to Dr Pasquale Hoyos regarding suicide precaution order to clarify if needed as patient is currently intubated and sedated. Per Dr Pasquale Hoyos pt does not need to be on suicide precautions at this time so order D/Cd. Will reevaluate need when patient is awake and extubated.

## 2017-12-24 NOTE — PROGRESS NOTES
TRANSFER - IN REPORT:    Verbal report received from KIKI Prasad on [employer] being received from ED for routine progression of care      Report consisted of patients Situation, Background, Assessment and Recommendations(SBAR). Information from the following report(s) SBAR, Kardex, ED Summary, Intake/Output, MAR, Recent Results and Cardiac Rhythm NSR was reviewed with the receiving nurse. Opportunity for questions and clarification was provided. Assessment completed upon patients arrival to unit and care assumed.

## 2017-12-24 NOTE — PROGRESS NOTES
Bedside shift change report given to Gracia Regalado (oncoming nurse) by Balaji Hill RN (offgoing nurse). Report included the following information SBAR, Kardex, ED Summary, Procedure Summary, Intake/Output, MAR, Recent Results and Cardiac Rhythm NSR. Fentanyl gtt signed off.

## 2017-12-24 NOTE — PROGRESS NOTES
Patient is very agitated, sedation medications increased to prevent removal of ETT. Will continue to monitor.

## 2017-12-24 NOTE — PROGRESS NOTES
Patient is resting calmly and is in no distress. BP is now marginal.  Sedation medications decreased per eMAR.

## 2017-12-24 NOTE — PROGRESS NOTES
Unable to complete admission data base at this time, patient is sedated and intubated and does not have any family at the hospital at this time to assist with admission questions.

## 2017-12-24 NOTE — H&P
[address] 65  HISTORY AND PHYSICAL      Name:HANSEL TAPIA  MR#: [id number]  : 1965  ACCOUNT #: [de-identified]   ADMIT DATE: 2017    REASON FOR ADMISSION:  Altered mental status, inability to protect airways and endotracheal intubation. HISTORY OF PRESENT ILLNESS:  The patient is a 60-year-old  female who was not able to give any history. History was obtained by medical records and daughter when she was here, but she is gone, who presented with altered mental status. Daughter is saying that the patient has had some personal issues. She was upset. She was found to be drinking wine and found her lying on the ground and could not wake her up. She suspected that she took some of her medications and she found 2 bottles of empty Ambien around 8:30. Patient was unresponsive. She was intubated in the ED. Please look at the intubation note. She also had alcohol intoxication with alcohol level of around 200. By the time I saw the patient, the patient was intubated, sedated, not able to provide any history. PAST MEDICAL HISTORY:  It looks like the patient has alcohol intoxication, irritable bowel syndrome. REVIEW OF SYSTEMS:  Unable to obtain. ALLERGIES:  SHE IS ALLERGIC TO EFFEXOR, TORADOL. CODE STATUS:  FULL. FAMILY HISTORY:  Unable to obtain. SOCIAL HISTORY:  Unable to obtain. REVIEW OF SYSTEMS:  Unable to obtain. By the time I saw the patient, the patient was intubated, sedated. PHYSICAL EXAMINATION:  VITAL SIGNS:  Temperature is 97.1, heart rate of 71, respiratory rate 17, blood pressure is 105/63 and she is satting 100% on 40% FiO2. GENERAL:  Intubated, sedated. HEENT:  Extraocular muscles intact. Pupils equal, reactive to light. CARDIOVASCULAR:  Normal S1, S2. No added sounds or murmurs. RESPIRATORY:  Clear to auscultation bilateral.  ABDOMEN:  Soft, lax. No tenderness. EXTREMITIES:  Lower limbs, no edema.   NEUROLOGIC: Intubated, sedated, withdrawing to pain. Upper and lower extremities symmetrical.      pH of 7.31, pCO2 of 46, pO2 of 307, that was on the ventilator. White count is 8.2, hemoglobin 12, platelets 083. Sodium 144, potassium 3.8, chloride 110, BUN 7, creatinine 0.41. The chest x-ray is with endotracheal tube. Head CT is pending. Alcohol level is 98. Tox screen is negative otherwise. ASSESSMENT:  1.  Suicidal attempt with what seems to be Ambien. 2.  Alcohol intoxication. 3.  Acute respiratory failure requiring mechanical intubation. PLAN:    1. At this point, continue supportive care. Continue with the ventilator until the patient wakes up. The patient was given activated charcoal.    2.  For alcohol level, will keep her on banana bag.  3.  Would keep her on slow IV fluid hydration until she wakes up. 4.  She will need to be on suicidal precautions when she is extubated. 5.  She will need psych and counseling when she is extubated. 6.  The patient can be on Lovenox for DVT prophylaxis. 7.  SHE IS A FULL CODE.       Deann BERRY  D: 12/24/2017 03:00     T: 12/24/2017 06:40  JOB #: [id number]

## 2017-12-24 NOTE — ED PROVIDER NOTES
HPI Comments: History is obtained from the patient's daughter as patient is unable to provide a meaningful history. She states that the patient has been drinking wine this evening. She has been upset over some personal issues recently. The daughter states that she found the patient lying down and could not wake her up. She was concerned that she might have taken an overdose of her medications but she  Is not sure about this. She thinks this happened around 8:30 PM.  The patient did not express any suicidal ideation. .  No other history is available as the patient is unresponsive. The daughter brought in her pill bottles, she is taking Norco and Ambien. She has 2 bottles of Ambien both of which are empty. Elements of this note were made using speech recognition software. As such, there may be errors of speech recognition present. Patient is a 46 y.o. female presenting with intoxication. The history is provided by a relative and the patient. Alcohol intoxication   Primary symptoms include: intoxication. Pertinent negatives include no fever, no nausea and no vomiting. History reviewed. No pertinent past medical history. Past Surgical History:   Procedure Laterality Date    ENDOSCOPY, COLON, DIAGNOSTIC  2013    normal (Dr. Ousmane Benson)    HX BREAST BIOPSY      fibrocystic '05    HX GYN  2005    hysterectomy ,left ovaries    HX HEENT      tonsilectomy    HARJINDER BIOPSY BREAST STEREOTACTIC Left 11/22/2016         Family History:   Problem Relation Age of Onset    Other Mother      macular degeneration    Cancer Maternal Aunt      breast 45s    Breast Cancer Maternal Aunt 52    Heart Disease Maternal Grandmother      CHF MI at age 61   [facility] Cancer Sister      vaginal cancer/squamous       Social History     Social History    Marital status: SINGLE     Spouse name: N/A    Number of children: N/A    Years of education: N/A     Occupational History    Not on file.      Social History Main Topics    Smoking status: Current Some Day Smoker     Packs/day: 1.00     Types: Cigarettes     Last attempt to quit: 2/8/2017    Smokeless tobacco: Never Used      Comment: now vapes    Alcohol use 0.0 oz/week     0 Standard drinks or equivalent per week      Comment: occasional    Drug use: No    Sexual activity: Not on file     Other Topics Concern    Not on file     Social History Narrative         ALLERGIES: Effexor [venlafaxine] and Toradol [ketorolac tromethamine]    Review of Systems   Constitutional: Negative for chills and fever. Gastrointestinal: Negative for nausea and vomiting. All other systems reviewed and are negative. Vitals:    12/23/17 2205 12/23/17 2224   BP: (!) 69/44 98/57   Pulse: 78 68   Resp: 14 17   Temp: 97.1 °F (36.2 °C)    SpO2: 99% 98%   Weight: 44.5 kg (98 lb)    Height: 5' 7.5\" (1.715 m)             Physical Exam   Constitutional:   Thin white female lying on the stretcher. She is breathing but is unresponsive   HENT:   Head: Normocephalic and atraumatic. Eyes: Conjunctivae are normal. Right eye exhibits no discharge. Left eye exhibits no discharge. Pupils are 2-3 mm bilaterally and sluggish   Neck: Normal range of motion. Neck supple. Cardiovascular: Normal rate and regular rhythm. Pulmonary/Chest: Effort normal and breath sounds normal.   Abdominal: Soft. Bowel sounds are normal.   Musculoskeletal: She exhibits no edema or tenderness. Neurological:   unresponsive   Skin: Skin is warm and dry. Nursing note and vitals reviewed. MDM  Number of Diagnoses or Management Options  Alcoholic intoxication with complication [facility]):    Altered mental status, unspecified altered mental status type: new and requires workup  Medication overdose, intentional self-harm, initial encounter [facility]): new and requires workup  Diagnosis management comments: differential diagnosis: drug overdose, etoh intoxication, closed head injury  10:10 PM called to room by nurse due to pt being unresponsive, possible overdose. Narcan given, no pupillary improvement  10:29 PM history was obtained from the patient's daughter who lives with her  11:45 PM additional history obtained from another daughter, Alma Delia Alvarado ([phone]). She states that the patient has tried to commit suicide in the past and feels that this clearly was a suicide attempt  12:58 AM Awaiting CT head. Spoke with Hospitalist about admission, Dr. Veronique Medellin will call back after consulting other Hospitalist  1:31 AM Dr. Veronique Medellin requests that the intensivist admit pt  1:45 AM Spoke with intensivist, to see pt for admission    Total critical care time spent on initial hydration, repeat evaluations, managing her altered mental status, obtaining additional history from family and speaking with the consultant was 45 minutes       Amount and/or Complexity of Data Reviewed  Clinical lab tests: ordered and reviewed  Tests in the radiology section of CPT®: ordered and reviewed  Tests in the medicine section of CPT®: ordered and reviewed  Obtain history from someone other than the patient: yes  Discuss the patient with other providers: yes  Independent visualization of images, tracings, or specimens: yes    Risk of Complications, Morbidity, and/or Mortality  Presenting problems: high  Diagnostic procedures: moderate  Management options: high    Critical Care  Total time providing critical care: 30-74 minutes    Patient Progress  Patient progress: improved    ED Course       Intubation  Date/Time: 12/23/2017 11:18 PM  Performed by: Violeta Gleason  Authorized by: Anyi WALL     Consent:     Consent obtained:  Verbal    Consent given by: daughter. Risks discussed:  Aspiration, dental trauma, laryngeal injury, bleeding and hypoxia    Alternatives discussed:  No treatment and observation  Pre-procedure details:     Patient status:  Unresponsive    Pretreatment meds: etomidate.     Paralytics:  Succinylcholine  Procedure details:     Preoxygenation: Bag valve mask    CPR in progress: no      Intubation method:  Oral    Oral intubation technique:  Video-assisted    Laryngoscope size: Glidescope. Tube size (mm):  7.0    Tube type:  Cuffed    Number of attempts:  3 or more    Ventilation between attempts: yes      Cricoid pressure: yes      Tube visualized through cords: yes    Placement assessment:     ETT to teeth:  22cm    Tube secured with:  ETT collins    Breath sounds:  Equal    Placement verification: ETCO2 detector      CXR findings:  ETT in proper place  Post-procedure details:     Patient tolerance of procedure:   Tolerated well, no immediate complications

## 2017-12-24 NOTE — PROGRESS NOTES
Patient briefly seen today. Extubated safely and is now able to give history. She is sleepy and anxious about unintentional weight loss. She is hemodynamically stable for transfer to floor, though needs telepsych evaluation, likely 5150 hold, hospitalist consultation.   Reno Blanco MD

## 2017-12-24 NOTE — PROGRESS NOTES
IV Fluids of NS started at 100 ml/hr via hepwell in her right antecubital area. Nicotine patch placed on her right shoulder. Resting in bed with television on.

## 2017-12-24 NOTE — PROGRESS NOTES
[facility] 79 CRITICAL CARE OUTREACH NURSE PROGRESS REPORT      SUBJECTIVE: Called to assess patient secondary to recent transfer from ICU. MEWS Score: 2 (12/24/17 1833)  Vitals:    12/24/17 1048 12/24/17 1103 12/24/17 1120 12/24/17 1257   BP: 115/59 116/67 145/65 128/64   Pulse: 80 70 88 81   Resp: 18 14 (!) 65 23   Temp:   98.6 °F (37 °C) 98.2 °F (36.8 °C)   SpO2: 100% 100% 100% 91%   Weight:       Height:          LAB DATA:    Recent Labs      12/23/17   2340   NA  144   K  3.8   CL  110*   CO2  25   AGAP  9   GLU  97   BUN  7   CREA  0.41*   GFRAA  >60   GFRNA  >60   CA  7.5*   ALB  3.3*   TP  6.6   GLOB  3.3   AGRAT  1.0*   ALT  18        Recent Labs      12/23/17   2340   WBC  8.2   HGB  12.0   HCT  35.3*   PLT  270          OBJECTIVE: On arrival to room, I found patient to be crying. Family at bedside. Pain Assessment  Pain Intensity 1: 0 (12/24/17 0603)                                          ASSESSMENT:  Patient is awake. Able to communicate needs. Lungs coarse. Denies SOB, pain, nausea. Complains of being cold and wishes to smoke a cigarette. Room temp warmed up. Made aware she can not go outside to smoke a cigarette. No acute distress. Family at bedside. PLAN:  Will continue to monitor per outreach protocol.

## 2017-12-24 NOTE — PROGRESS NOTES
Patient was intubated with a number 7.0 ET Tube. Tube placement verified by auscultation and ETCO2 monitor. ET Tube is secured at the 22 cm amada at the teeth and on the bilateral side. Patient was intubated by Dr Anabell Walters on the 4 attempt. Breath sounds are diminished. Patient is Negative for subcutaneous air and chest excursion is symmetric. Trachea is midline. Patient is also Negative for cyanosis and is Negative for pitting edema. Patient placed on ventilator on documented settings. All alarms are set and audible. Resuscitation bag is at the head of the bed.       Ventilator Settings  Mode FIO2 Rate Tidal Volume Pressure PEEP I:E Ratio   PRVC  40 % (weaned from 60% post ABG)    450 ml     8 cm H20         Peak airway pressure: 24 cm H2O   Minute ventilation: 12.6 l/min     ABG:   Recent Labs      12/23/17   2310   PH  7.31*   PCO2  46*   PO2  307*   HCO3  22

## 2017-12-24 NOTE — PROGRESS NOTES
TRANSFER - IN REPORT:    Verbal report received from [facility] RN (name) on [address]  being received from Intensive Care Unit (unit), room 3110 for routine progression of care      Report consisted of patients Situation, Background, Assessment and   Recommendations(SBAR). Information from the following report(s) Kardex was reviewed with the receiving nurse. Opportunity for questions and clarification was provided. Assessment completed upon patients arrival to unit and care assumed.

## 2017-12-24 NOTE — ED NOTES
TRANSFER - OUT REPORT:    Verbal report given to Romelia Valero on [employer]  being transferred to [address] [place] [address] for routine progression of care       Report consisted of patients Situation, Background, Assessment and   Recommendations(SBAR). Information from the following report(s) SBAR, ED Summary, [facility] and Recent Results was reviewed with the receiving nurse. Lines:   Peripheral IV 12/23/17 Left Forearm (Active)        Opportunity for questions and clarification was provided.       Patient transported with:   Monitor  Patient-specific medications from Pharmacy  Registered Nurse     *Patient going to CT prior to ICU

## 2017-12-24 NOTE — PROGRESS NOTES
Patient arrived to room via wheelchair. Alert and oriented times three. Demonstrating no signs of dyspnea or distress on room air. Escorted by multiple family members. Transfer and skin assessments completed with Brian Galvan RN. Patient has no skin issues at this time. Will continue to monitor.

## 2017-12-24 NOTE — PROGRESS NOTES
TRANSFER - OUT REPORT:    Verbal report given to [facility], RN (name) on [address]  being transferred to [phone] (unit) for routine progression of care       Report consisted of patients Situation, Background, Assessment and   Recommendations(SBAR). Information from the following report(s) SBAR, Kardex, ED Summary, Procedure Summary, Intake/Output, MAR, Recent Results and Cardiac Rhythm NSR was reviewed with the receiving nurse. Lines:   Peripheral IV 12/23/17 Left Forearm (Active)   Site Assessment Clean, dry, & intact 12/24/2017  7:18 AM   Phlebitis Assessment 0 12/24/2017  7:18 AM   Infiltration Assessment 0 12/24/2017  7:18 AM   Dressing Status Clean, dry, & intact 12/24/2017  7:18 AM   Dressing Type Tape;Transparent 12/24/2017  7:18 AM   Hub Color/Line Status Green;Patent; Flushed; Infusing 12/24/2017  7:18 AM   Alcohol Cap Used No 12/24/2017  7:18 AM       Peripheral IV 12/23/17 Right Forearm (Active)   Site Assessment Clean, dry, & intact 12/24/2017  7:18 AM   Phlebitis Assessment 0 12/24/2017  7:18 AM   Infiltration Assessment 0 12/24/2017  7:18 AM   Dressing Status Clean, dry, & intact 12/24/2017  7:18 AM   Dressing Type Tape;Transparent 12/24/2017  7:18 AM   Hub Color/Line Status Pink;Patent; Infusing 12/24/2017  7:18 AM   Alcohol Cap Used No 12/24/2017  7:18 AM        Opportunity for questions and clarification was provided.       Patient transported with:   O2 @ 2 liters

## 2017-12-25 ENCOUNTER — APPOINTMENT (OUTPATIENT)
Dept: GENERAL RADIOLOGY | Age: 52
DRG: 917 | End: 2017-12-25
Attending: EMERGENCY MEDICINE
Payer: SUBSIDIZED

## 2017-12-25 PROBLEM — E43 PROTEIN-CALORIE MALNUTRITION, SEVERE (HCC): Status: ACTIVE | Noted: 2017-12-25

## 2017-12-25 LAB
ALBUMIN SERPL-MCNC: 2.8 G/DL (ref 3.5–5)
ALBUMIN/GLOB SERPL: 0.9 {RATIO} (ref 1.2–3.5)
ALP SERPL-CCNC: 76 U/L (ref 50–136)
ALT SERPL-CCNC: 15 U/L (ref 12–65)
ANION GAP SERPL CALC-SCNC: 6 MMOL/L (ref 7–16)
APPEARANCE UR: CLEAR
AST SERPL-CCNC: 12 U/L (ref 15–37)
ATRIAL RATE: 77 BPM
BASOPHILS # BLD: 0 K/UL (ref 0–0.2)
BASOPHILS NFR BLD: 0 % (ref 0–2)
BILIRUB SERPL-MCNC: 0.4 MG/DL (ref 0.2–1.1)
BILIRUB UR QL: NEGATIVE
BUN SERPL-MCNC: 4 MG/DL (ref 6–23)
CALCIUM SERPL-MCNC: 7.9 MG/DL (ref 8.3–10.4)
CALCULATED P AXIS, ECG09: 76 DEGREES
CALCULATED R AXIS, ECG10: 90 DEGREES
CALCULATED T AXIS, ECG11: 60 DEGREES
CHLORIDE SERPL-SCNC: 111 MMOL/L (ref 98–107)
CO2 SERPL-SCNC: 26 MMOL/L (ref 21–32)
COLOR UR: YELLOW
CREAT SERPL-MCNC: 0.46 MG/DL (ref 0.6–1)
DIAGNOSIS, 93000: NORMAL
DIFFERENTIAL METHOD BLD: ABNORMAL
EOSINOPHIL # BLD: 0 K/UL (ref 0–0.8)
EOSINOPHIL NFR BLD: 0 % (ref 0.5–7.8)
ERYTHROCYTE [DISTWIDTH] IN BLOOD BY AUTOMATED COUNT: 12.5 % (ref 11.9–14.6)
FLUAV AG NPH QL IA: NEGATIVE
FLUBV AG NPH QL IA: NEGATIVE
GLOBULIN SER CALC-MCNC: 3.1 G/DL (ref 2.3–3.5)
GLUCOSE SERPL-MCNC: 117 MG/DL (ref 65–100)
GLUCOSE UR STRIP.AUTO-MCNC: NEGATIVE MG/DL
HCT VFR BLD AUTO: 33 % (ref 35.8–46.3)
HGB BLD-MCNC: 11.4 G/DL (ref 11.7–15.4)
HGB UR QL STRIP: NEGATIVE
IMM GRANULOCYTES # BLD: 0 K/UL (ref 0–0.5)
IMM GRANULOCYTES NFR BLD AUTO: 0 % (ref 0–5)
KETONES UR QL STRIP.AUTO: NEGATIVE MG/DL
LACTATE SERPL-SCNC: 0.8 MMOL/L (ref 0.4–2)
LEUKOCYTE ESTERASE UR QL STRIP.AUTO: NEGATIVE
LYMPHOCYTES # BLD: 1.2 K/UL (ref 0.5–4.6)
LYMPHOCYTES NFR BLD: 13 % (ref 13–44)
MAGNESIUM SERPL-MCNC: 2 MG/DL (ref 1.8–2.4)
MCH RBC QN AUTO: 33 PG (ref 26.1–32.9)
MCHC RBC AUTO-ENTMCNC: 34.5 G/DL (ref 31.4–35)
MCV RBC AUTO: 95.7 FL (ref 79.6–97.8)
MONOCYTES # BLD: 0.8 K/UL (ref 0.1–1.3)
MONOCYTES NFR BLD: 9 % (ref 4–12)
NEUTS SEG # BLD: 6.6 K/UL (ref 1.7–8.2)
NEUTS SEG NFR BLD: 78 % (ref 43–78)
NITRITE UR QL STRIP.AUTO: NEGATIVE
P-R INTERVAL, ECG05: 140 MS
PH UR STRIP: 7 [PH] (ref 5–9)
PHOSPHATE SERPL-MCNC: 2.1 MG/DL (ref 2.5–4.5)
PLATELET # BLD AUTO: 228 K/UL (ref 150–450)
PMV BLD AUTO: 10.1 FL (ref 10.8–14.1)
POTASSIUM SERPL-SCNC: 3.2 MMOL/L (ref 3.5–5.1)
PROCALCITONIN SERPL-MCNC: <0.1 NG/ML
PROT SERPL-MCNC: 5.9 G/DL (ref 6.3–8.2)
PROT UR STRIP-MCNC: NEGATIVE MG/DL
Q-T INTERVAL, ECG07: 384 MS
QRS DURATION, ECG06: 86 MS
QTC CALCULATION (BEZET), ECG08: 434 MS
RBC # BLD AUTO: 3.45 M/UL (ref 4.05–5.25)
SODIUM SERPL-SCNC: 143 MMOL/L (ref 136–145)
SP GR UR REFRACTOMETRY: 1 (ref 1–1.02)
UROBILINOGEN UR QL STRIP.AUTO: 0.2 EU/DL (ref 0.2–1)
VENTRICULAR RATE, ECG03: 77 BPM
WBC # BLD AUTO: 8.6 K/UL (ref 4.3–11.1)

## 2017-12-25 PROCEDURE — 74011250636 HC RX REV CODE- 250/636: Performed by: INTERNAL MEDICINE

## 2017-12-25 PROCEDURE — 36415 COLL VENOUS BLD VENIPUNCTURE: CPT | Performed by: NURSE PRACTITIONER

## 2017-12-25 PROCEDURE — 65270000029 HC RM PRIVATE

## 2017-12-25 PROCEDURE — 83735 ASSAY OF MAGNESIUM: CPT | Performed by: NURSE PRACTITIONER

## 2017-12-25 PROCEDURE — 85025 COMPLETE CBC W/AUTO DIFF WBC: CPT | Performed by: NURSE PRACTITIONER

## 2017-12-25 PROCEDURE — 83605 ASSAY OF LACTIC ACID: CPT | Performed by: NURSE PRACTITIONER

## 2017-12-25 PROCEDURE — 87804 INFLUENZA ASSAY W/OPTIC: CPT | Performed by: INTERNAL MEDICINE

## 2017-12-25 PROCEDURE — 71020 XR CHEST PA LAT: CPT

## 2017-12-25 PROCEDURE — 74011250637 HC RX REV CODE- 250/637: Performed by: INTERNAL MEDICINE

## 2017-12-25 PROCEDURE — 81003 URINALYSIS AUTO W/O SCOPE: CPT | Performed by: NURSE PRACTITIONER

## 2017-12-25 PROCEDURE — 84100 ASSAY OF PHOSPHORUS: CPT | Performed by: NURSE PRACTITIONER

## 2017-12-25 PROCEDURE — 74011000250 HC RX REV CODE- 250: Performed by: INTERNAL MEDICINE

## 2017-12-25 PROCEDURE — 93005 ELECTROCARDIOGRAM TRACING: CPT | Performed by: NURSE PRACTITIONER

## 2017-12-25 PROCEDURE — 80053 COMPREHEN METABOLIC PANEL: CPT | Performed by: NURSE PRACTITIONER

## 2017-12-25 PROCEDURE — 84145 PROCALCITONIN (PCT): CPT | Performed by: NURSE PRACTITIONER

## 2017-12-25 PROCEDURE — 74011250637 HC RX REV CODE- 250/637: Performed by: NURSE PRACTITIONER

## 2017-12-25 RX ORDER — POTASSIUM CHLORIDE 20 MEQ/1
40 TABLET, EXTENDED RELEASE ORAL 2 TIMES DAILY
Status: DISCONTINUED | OUTPATIENT
Start: 2017-12-25 | End: 2017-12-28 | Stop reason: HOSPADM

## 2017-12-25 RX ORDER — DOCUSATE SODIUM 100 MG/1
100 CAPSULE, LIQUID FILLED ORAL
Status: DISCONTINUED | OUTPATIENT
Start: 2017-12-25 | End: 2017-12-28 | Stop reason: HOSPADM

## 2017-12-25 RX ORDER — PHENAZOPYRIDINE HYDROCHLORIDE 95 MG/1
190 TABLET ORAL 2 TIMES DAILY
Status: DISCONTINUED | OUTPATIENT
Start: 2017-12-25 | End: 2017-12-28 | Stop reason: HOSPADM

## 2017-12-25 RX ORDER — ACETAMINOPHEN 325 MG/1
650 TABLET ORAL
Status: DISCONTINUED | OUTPATIENT
Start: 2017-12-25 | End: 2017-12-28 | Stop reason: HOSPADM

## 2017-12-25 RX ORDER — HYDROXYZINE PAMOATE 50 MG/1
50 CAPSULE ORAL
Status: DISCONTINUED | OUTPATIENT
Start: 2017-12-25 | End: 2017-12-28 | Stop reason: HOSPADM

## 2017-12-25 RX ORDER — SODIUM,POTASSIUM PHOSPHATES 280-250MG
1 POWDER IN PACKET (EA) ORAL 4 TIMES DAILY
Status: DISCONTINUED | OUTPATIENT
Start: 2017-12-25 | End: 2017-12-28 | Stop reason: HOSPADM

## 2017-12-25 RX ADMIN — Medication 5 ML: at 14:59

## 2017-12-25 RX ADMIN — ACETAMINOPHEN 650 MG: 325 TABLET ORAL at 20:00

## 2017-12-25 RX ADMIN — ENOXAPARIN SODIUM 30 MG: 30 INJECTION SUBCUTANEOUS at 09:45

## 2017-12-25 RX ADMIN — ACETAMINOPHEN 650 MG: 325 TABLET ORAL at 02:52

## 2017-12-25 RX ADMIN — FOLIC ACID: 5 INJECTION, SOLUTION INTRAMUSCULAR; INTRAVENOUS; SUBCUTANEOUS at 02:44

## 2017-12-25 RX ADMIN — ACETAMINOPHEN 650 MG: 325 TABLET ORAL at 10:04

## 2017-12-25 RX ADMIN — Medication 1 AMPULE: at 09:51

## 2017-12-25 RX ADMIN — POTASSIUM & SODIUM PHOSPHATES POWDER PACK 280-160-250 MG 1 PACKET: 280-160-250 PACK at 17:48

## 2017-12-25 RX ADMIN — SODIUM CHLORIDE 100 ML/HR: 900 INJECTION, SOLUTION INTRAVENOUS at 11:01

## 2017-12-25 RX ADMIN — URINARY PAIN RELIEF 190 MG: 95 TABLET ORAL at 17:59

## 2017-12-25 RX ADMIN — POTASSIUM CHLORIDE 40 MEQ: 20 TABLET, EXTENDED RELEASE ORAL at 17:53

## 2017-12-25 RX ADMIN — POTASSIUM & SODIUM PHOSPHATES POWDER PACK 280-160-250 MG 1 PACKET: 280-160-250 PACK at 14:54

## 2017-12-25 NOTE — ROUTINE PROCESS
Dr. Connie Pimentel made aware of psych eval and recommendations with suicide precautions and sitter placed at the bedside. Pt made aware of pt continuing to spike high temps with flu screen negative. No further orders.

## 2017-12-25 NOTE — PROGRESS NOTES
Chief Complaint:  Suicidal overdose     We are asked to see this patient regarding care of medical needs. Patient Active Problem List    Diagnosis Date Noted    Altered mental status 12/24/2017    Suicidal overdose ([facility] [address] 12/24/2017    Alcohol intoxication ([facility] [address] 12/24/2017    IBS (irritable bowel syndrome) 11/06/2013     History of Present Illness:    Patient is a 46 y.o.  female who presented to ER about 2200 on 12/23 after daughter who lives with her found her unresponsive after drinking unknown amounts of white wine and taking unknown amounts of unknown type pills. Daughter reports patient emotional of late due to relationship breakup, and believes there to be suicidal intent. States patient has attempted to commit suicide in the past.  Psych MD notes state patient has not attempted, just contemplated. She also has history of alcohol abuse with 1/30 days sober recently. She has participated in prior community programs. She was unresponsive with periods of apnea on admission. Daughter believes pills were taken about 1.5 hours PTA, patient last used cell phone at that time. Daughter found 2 empty bottles of ambien and one of norco. Narcan was given without improvement. ETOH level: 198. Hypotensive to 69/44. An ETT tube was placed in ERand patient placed on vent. Activated charcoal was given, begun on banana bag. Admitted per Intensivist to ICU. Agitation noted early 12/24. Sedatives given. Blood pressure remained marginal.  Extubated at 0900 12/24. Remained drowsy. Reports unintentional weight loss also causing anxiety, #10 in past three months, from 110-115 down to 97. Swears she is eating normally. Transferred to floor with sitter 12/24 afternoon. Crying a lot. Wants to smoke, nicotine patch placed. 12/24/ pm:  Temp up to 101.8 at 1905. Blood and urine cultures pending. Negative flu screen. No additional labs or x-rays ordered.   Psych eval reviewed with patient being high safety risk and recommendations for involuntary commitment and use of atarax prn. Today: Painfully thin. Temp jumped to 103.3 this am.  Currently upset regarding sitter, suicide precautions, commitment, , constipation, fatty cyst on arm, weight loss, UTI symptoms, etc.  Discussing anything other than why she is here. Informed her the weight loss and fatty cyst on arm are outpatient issues. She is apparently in the process of workup with colonoscopy 11/17 per Dr Tiffanie Shen, negative other than polyps. Did not get CT abdomen ordered due to insurance refusal to pay. No clear cause for fever other than exposure to URI at work 4 days ago. WBC normal, chemistry normal with the exception of potassium of 3.2 and phos of  2.1, calcium 7.9, glucose 117. CXR pending at present. No BM x 3 days. Requesting stool softener. States she knows she has a UTI, Requesting antibiotic. Informed her urinalysis is negative. Culture pending. PAST MEDICAL HISTORY:  IBS  Depression with suicidal attempts  Colon polyps  #10 unintentional weight loss      PAST SURGICAL HISTORY:  Past Surgical History:   Procedure Laterality Date    ENDOSCOPY, COLON, DIAGNOSTIC  2013    normal (Dr. Yves Hung)    HX BREAST BIOPSY      fibrocystic '05    HX GYN  2005    hysterectomy ,left ovaries    HX HEENT      tonsilectomy    HARJINDER BIOPSY BREAST STEREOTACTIC Left 11/22/2016      PTA MEDICATIONS:  Prior to Admission medications    Medication Sig Start Date End Date Taking? Authorizing Provider   metroNIDAZOLE (FLAGYL) 500 mg tablet Take 1 Tab by mouth two (2) times a day. 10/10/17   Agusto Hollins MD   zolpidem (AMBIEN) 10 mg tablet Take 1/2 tablet at bedtime as needed 10/10/17   Agusto Hollins MD   aspirin delayed-release 81 mg tablet Take  by mouth daily.     Historical Provider     CURRENT INPATIENT MEDICATIONS:  Current Facility-Administered Medications   Medication Dose Route Frequency    sodium chloride (NS) flush 5-10 mL 5-10 mL IntraVENous Q8H    sodium chloride (NS) flush 5-10 mL  5-10 mL IntraVENous PRN    enoxaparin (LOVENOX) injection 30 mg  30 mg SubCUTAneous Q24H    fentaNYL in normal saline (pf) 25 mcg/mL infusion  0-200 mcg/hr IntraVENous TITRATE    0.9% sodium chloride infusion  100 mL/hr IntraVENous CONTINUOUS    0.9% sodium chloride 1,000 mL with mvi, adult no. 4 with vit K 10 mL, thiamine 952 mg, folic acid 1 mg infusion   IntraVENous Q24H    alcohol 62% (NOZIN) nasal  1 Ampule  1 Ampule Topical Q12H    nicotine (NICODERM CQ) 14 mg/24 hr patch 1 Patch  1 Patch TransDERmal DAILY    acetaminophen (TYLENOL) tablet 650 mg  650 mg Oral Q6H PRN    zolpidem (AMBIEN) tablet 5 mg  5 mg Oral QHS PRN    0.9% sodium chloride infusion 1,000 mL  1,000 mL IntraVENous CONTINUOUS    propofol (DIPRIVAN) infusion  0-50 mcg/kg/min IntraVENous TITRATE       ALLERGIES:  Allergies   Allergen Reactions    Effexor [Venlafaxine] Other (comments)     Too much\" could not fcn    Toradol [Ketorolac Tromethamine] Itching      FAMILY HISTORY:   Family History   Problem Relation Age of Onset    Other Mother      macular degeneration    Cancer Maternal Aunt      breast 45s    Breast Cancer Maternal Aunt 52    Heart Disease Maternal Grandmother      CHF MI at age 61   [employer] [place] Cancer Sister      vaginal cancer/squamous     SOCIAL HISTORY:  Social History   Substance Use Topics    Smoking status: Current Some Day Smoker     Packs/day: 1.00     Types: Cigarettes     Last attempt to quit: 2/8/2017    Smokeless tobacco: Never Used      Comment: now vapes    Alcohol use 0.0 oz/week     0 Standard drinks or equivalent per week      Comment: occasional      History   Drug Use No      SOCIAL HISTORY:  PATIENT IS , LIVES WITH DAUGHTER AND DAUGHTER'S BOYFRIEND. WORKS IN MANUFACTURING. Review of Systems  ROS were reviewed, and all are negative outside the HPI.     Physical Examination:   Patient Vitals for the past 24 hrs:   BP Temp Pulse Resp SpO2   12/25/17 0719 112/58 98.8 °F (37.1 °C) 68 20 97 %   12/25/17 0350 - (!) 101.6 °F (38.7 °C) - - -   12/25/17 0249 125/64 (!) 103.3 °F (39.6 °C) 85 20 96 %   12/24/17 2351 99/54 100.3 °F (37.9 °C) 84 20 96 %   12/24/17 1916 114/67 (!) 101.8 °F (38.8 °C) 98 20 96 %   12/24/17 1526 124/55 99.9 °F (37.7 °C) 92 20 97 %   12/24/17 1257 128/64 98.2 °F (36.8 °C) 81 23 91 %   12/24/17 1120 145/65 98.6 °F (37 °C) 88 (!) 65 100 %   12/24/17 1103 116/67 - 70 14 100 %   12/24/17 1048 115/59 - 80 18 100 %   12/24/17 1033 121/67 - 88 (!) 34 100 %   12/24/17 1018 112/61 - 78 17 100 %   12/24/17 1003 118/68 - 77 17 100 %   12/24/17 0948 121/68 - 80 12 100 %   12/24/17 0932 136/73 - 87 19 100 %   12/24/17 0919 131/63 - 87 - 99 %   12/24/17 0903 137/70 - 89 15 98 %   12/24/17 0848 134/66 - 86 - 100 %   12/24/17 0837 - - 70 18 100 %   12/24/17 0833 96/55 - 68 18 100 %   12/24/17 0818 99/57 - 71 18 100 %   12/24/17 0803 106/58 - 75 18 100 %     Height: 5' 7.5\" (171.5 cm)  Weight: 44.1 kg (97 lb 3.6 oz)  BMI (calculated): 15     12/23 1901 - 12/25 0700  In: 4001.6 [P.O.:1140; I.V.:2861.6]  Out: 1000 [Urine:1000]    General appearance: Awake, alert, oriented, extremely anxious with multiple complaints. Family in room so suspect complaints and discussion regarding mental health are limited. Extremely thin, states she \"eats all the time. \" unclear what type of drugs she may use or how much alcohol she consumes daily. Multiple family members in room. Head: Normocephalic, without obvious abnormality, atraumatic  Neck: supple, symmetrical, trachea midline, no JVD  Lungs: clear to auscultation bilaterally  Heart: regular rate and rhythm, S1, S2 normal, no murmur, click, rub or gallop  Abdomen: soft, non-tender. Bowel sounds normal. No masses,  no organomegaly  Extremities: extremities normal, atraumatic, no cyanosis or edema  Skin: Skin color, texture, turgor normal dulled and dry.   No rashes or lesions  Neurologic: Grossly normal    Labs:  Recent Results (from the past 24 hour(s))   CULTURE, BLOOD    Collection Time: 12/24/17  7:34 PM   Result Value Ref Range    Special Requests: RIGHT  ARM        Culture result: NO GROWTH AFTER 10 HOURS     CULTURE, BLOOD    Collection Time: 12/24/17  7:43 PM   Result Value Ref Range    Special Requests: LEFT  Antecubital        Culture result: NO GROWTH AFTER 10 HOURS     INFLUENZA A & B AG (RAPID TEST)    Collection Time: 12/25/17  2:56 AM   Result Value Ref Range    Influenza A Ag NEGATIVE  NEG      Influenza B Ag NEGATIVE  NEG       PMD:  C Tod    Category Status:  1    URINALYSIS:     Ref. Range 12/25/2017 11:55   Color Latest Units:   YELLOW   Appearance Latest Units:   CLEAR   Specific gravity Latest Ref Range: 1.001 - 1.023   1.001   pH (UA) Latest Ref Range: 5.0 - 9.0   7.0   Protein Latest Ref Range: NEG mg/dL NEGATIVE   Glucose Latest Units: mg/dL NEGATIVE   Ketone Latest Ref Range: NEG mg/dL NEGATIVE   Blood Latest Ref Range: NEG   NEGATIVE   Bilirubin Latest Ref Range: NEG   NEGATIVE   Urobilinogen Latest Ref Range: 0.2 - 1.0 EU/dL 0.2   Nitrites Latest Ref Range: NEG   NEGATIVE   Leukocyte Esterase Latest Ref Range: NEG   NEGATIVE     URINE CULTURE DONE 12/24: NGTD    BLOOD CULTURE:  NGTD 2/2    INFLUENZA A AND B:  Negative     Ref.  Range 12/25/2017 12:18   WBC Latest Ref Range: 4.3 - 11.1 K/uL 8.6   RBC Latest Ref Range: 4.05 - 5.25 M/uL 3.45 (L)   HGB Latest Ref Range: 11.7 - 15.4 g/dL 11.4 (L)   HCT Latest Ref Range: 35.8 - 46.3 % 33.0 (L)   MCV Latest Ref Range: 79.6 - 97.8 FL 95.7   MCH Latest Ref Range: 26.1 - 32.9 PG 33.0 (H)   MCHC Latest Ref Range: 31.4 - 35.0 g/dL 34.5   RDW Latest Ref Range: 11.9 - 14.6 % 12.5   PLATELET Latest Ref Range: 150 - 450 K/uL 228   MPV Latest Ref Range: 10.8 - 14.1 FL 10.1 (L)   NEUTROPHILS Latest Ref Range: 43 - 78 % 78   LYMPHOCYTES Latest Ref Range: 13 - 44 % 13   MONOCYTES Latest Ref Range: 4.0 - 12.0 % 9   EOSINOPHILS Latest Ref Range: 0.5 - 7.8 % 0 (L)   BASOPHILS Latest Ref Range: 0.0 - 2.0 % 0   IMMATURE GRANULOCYTES Latest Ref Range: 0.0 - 5.0 % 0   DF Latest Units:   AUTOMATED   ABS. NEUTROPHILS Latest Ref Range: 1.7 - 8.2 K/UL 6.6   ABS. IMM. GRANS. Latest Ref Range: 0.0 - 0.5 K/UL 0.0   ABS. LYMPHOCYTES Latest Ref Range: 0.5 - 4.6 K/UL 1.2   ABS. MONOCYTES Latest Ref Range: 0.1 - 1.3 K/UL 0.8   ABS. EOSINOPHILS Latest Ref Range: 0.0 - 0.8 K/UL 0.0   ABS. BASOPHILS Latest Ref Range: 0.0 - 0.2 K/UL 0.0        Ref. Range 12/25/2017 11:31   Sodium Latest Ref Range: 136 - 145 mmol/L 143   Potassium Latest Ref Range: 3.5 - 5.1 mmol/L 3.2 (L)   Chloride Latest Ref Range: 98 - 107 mmol/L 111 (H)   CO2 Latest Ref Range: 21 - 32 mmol/L 26   Anion gap Latest Ref Range: 7 - 16 mmol/L 6 (L)   Glucose Latest Ref Range: 65 - 100 mg/dL 117 (H)   BUN Latest Ref Range: 6 - 23 MG/DL 4 (L)   Creatinine Latest Ref Range: 0.6 - 1.0 MG/DL 0.46 (L)   Calcium Latest Ref Range: 8.3 - 10.4 MG/DL 7.9 (L)   Phosphorus Latest Ref Range: 2.5 - 4.5 MG/DL 2.1 (L)   Magnesium Latest Ref Range: 1.8 - 2.4 mg/dL 2.0   GFR est non-AA Latest Ref Range: >60 ml/min/1.73m2 >60   GFR est AA Latest Ref Range: >60 ml/min/1.73m2 >60   Bilirubin, total Latest Ref Range: 0.2 - 1.1 MG/DL 0.4   Protein, total Latest Ref Range: 6.3 - 8.2 g/dL 5.9 (L)   Albumin Latest Ref Range: 3.5 - 5.0 g/dL 2.8 (L)   Globulin Latest Ref Range: 2.3 - 3.5 g/dL 3.1   A-G Ratio Latest Ref Range: 1.2 - 3.5   0.9 (L)   ALT (SGPT) Latest Ref Range: 12 - 65 U/L 15   AST Latest Ref Range: 15 - 37 U/L 12 (L)   Alk. phosphatase Latest Ref Range: 50 - 136 U/L 76     CT HEAD:    BRAIN:      -  There are no early signs of territorial or lacunar infarction by CT.     -  No intracranial mass, hematoma, or hydrocephalus.      -  No gross white matter abnormality by CT.     VISUALIZED PARANASAL SINUSES: Chronic bilateral ethmoid as well as right maxillary sinusitis. MASTOIDS:  Clear.   CALVARIUM AND SCALP: Unremarkable. IMPRESSION:  Unremarkable brain CT without intravenous contrast. R graph chronic sinusitis. Assessment and Plan: Altered mental status  Anxiety  Suicidal overdose  Alcohol intoxication   FUO 12/24/17  History of prior suicide attempts  Long standing tobacco abuse  History of alcohol abuse  Hypokalemia  Hypophosphatemia  Severe protein calorie malnutrition  Unintentional weight loss #10 in 3 months    PLAN:  Stat:  CBC, CMP, Mag, Phos, Procal, Lactic acid  Blood and urine cultures pending  CXR Pending  Replace potassium  Repeat labs in am  Pyridium     Thank you for involving us in the care of this pleasant patient. Will follow.

## 2017-12-25 NOTE — PROGRESS NOTES
Problem: Patient Education: Go to Patient Education Activity  Goal: Patient/Family Education  Nutrition  Reason for assessment: Received nutrition consult for general nutrition management and supplements. Noted also has  consult for commitment to [facility] facility. Assessment:   Diet order(s): Regular  Food/Nutrition Patient History:  Pt admitted after suicide attempt with ETOH and Ambien. Pt seen in company of mother and friend. Pt reports she started losing weight in August but denies any change in intake and says \"I eat, I'm not anorexic\". Pt says she has been eating every meal she has received here. She notes she did not recevie breakfast this morning d/t being NPO. However she was brought a turkey sandwich fore her breakfast which remains untouched at her bedside. She laos had the roll and apple pie from lunch today at her bedside. She says she plans on eating all these items later today. Diet history PTA. Note pt started a new \"fast paced\" job 8-17-17. She drinks coffee before work, a pop tart and yogurt ( if she has time) for AM breakfast, gets a $5 meal at [facility] for lunch of which she eats part of, maybe mashed potatoes, and the eats more of it at her afternoon break. Pt says she fixes things like lasagna or steak for supper. She then answers a phone call and mother says when the pt gets home she is busy doing chores and then says she is too tired to eat. Therefore Rd questions is Pt was over reporting meal and snack intake at work. Pt finished phone call and RD queried about nutritional supplements. She says she was told to drink Boost, but it was too expensive so she went to [address] and got Pro weight gain which she then says she drinks at breakfast. However she has no idea how many kcal are in the [employer] [employer] product she uses. Even though weight loss started at time of new job, she sees no correlation between a change in intake or potential increased energy expenditure.  Pt more focused on proving to RD hat she has lost weight and wants to show RD before and after pictures and wants to know how much she weighs now. Pt reports -115#, says she has always been thin and high energy. Lowest recent weight was 92# at time of colonoscopy on 11-22-17. BUN 4 potentially c/w decline in protein status  Anthropometrics:Height: 5' 7.5\" (171.5 cm),  Weight: 44.1 kg (97 lb 3.6 oz), Weight Source: Bed, Body mass index is 15 kg/(m^2). BMI class of underweight or severe thinness. Weight hx per EMR ( Based on [facility] care functionality, RD cannot know if these weight are actual versus stated):    WT / BMI 10/24/2017 10/10/2017 7/19/2017 6/6/2017 5/30/2017   WEIGHT 100 lb 6.4 oz 97 lb 12.8 oz 105 lb 12.8 oz 111 lb 107 lb     WT / BMI 4/3/2017 2/15/2017 12/12/2016   WEIGHT 111 lb 107 lb 107 lb   Noted reported UBW range is greater than > than UBW range per EMR  ~12% change in wt within < 6 months c/w severe change. Macronutrient needs:  EER:  7740-1650 kcal /day (40-45 kcal/kg ABW)  EPR:   grams protein/day (1.5-2 grams/kg IBW)  Intake/Comparative Standards:  Average intake for past 1 day(s)/2 recorded meal(s): 88%. This potentially meets ~100% of kcal and ~88% of protein needs  Meets Criteria for Social/Environmental Related Malnutrition   [x] Severe Malnutrition, as evidenced by:   [x] Severe loss of muscle mass   [] Nutritional intake of <50% of energy intake compared to estimated energy needs for > 1 month. Unable to determine d/t inconsistent diet history with questionable over reporting of intake. [x] Weight loss of  >5% in 1 month, >7.5% in 3 months, or >10% in 6 months,>20% in 12 months   [] Severe edema   [x] Severe loss of subcutaneous fat   [] Functional decline       Nutrition Diagnosis: Malnutrition r/t intake < needs, potential disordered eating pattern as evidenced by weight loss, fat and musacle loss, BMI, and inconsistent diet history as above    Intervention:  Meals and snacks: Continue current diet.   Nutrition Supplement Therapy: Ensure Enlive tid   Discharge nutrition plan:Plan for discharge to inpatient psych facility. F/U by RD at this facility. Education:Provided pt and mother with written and verbal instruction on high kcal, high protein diet. Mother voices fair to good understanding. Pt's understanding is questionable as she would often divert conversation to other topics or ask about a specific food. Expected compliance is questionable but may be improved with treatment of pscyh needs.     Wai Frazier, [address] [place] [address], [address] [place] [address]

## 2017-12-25 NOTE — PROGRESS NOTES
Remainder of tests resulted:    LACTIC ACID: 0.8  PROCAL: < 0.1  CXR:  Lungs are hyperinflated and clear. Heart and pulmonary vasculature are unremarkable. ET tube and NG tube been removed. IMPRESSION: Negative for acute abnormality. Hyperinflation suggesting emphysema.     Cause for fever is unclear at this time. Will continue to monitor  PPD  Add atarax prn as recommended by Telepsych  Cultures pending  Commitment as soon as placement secured.

## 2017-12-25 NOTE — PROGRESS NOTES
Per staff and medical record- hospitalist to assume care, patient seen by [facility] and needs inpatient psychiatric Rx.     Shaheen Werner MD.

## 2017-12-25 NOTE — PROGRESS NOTES
Morning bedside rounding report received from Renny Shearer., KIKI. Patient resting in bed, playing with her iPhone, demonstrating no signs of dyspnea or distress on room air. IV Fluids of multi-vitamins infusing at 125 ml/hr via hepwell in her left arm. States that she wants to sleep without being disturbed. Will continue to monitor. Sitter outside of room at this time. Will continue to monitor.

## 2017-12-25 NOTE — PROGRESS NOTES
Psych eval reviewed stating pt high safety risk and recommends involuntary commitment so sitter placed at the bedside for suicide precautions.

## 2017-12-25 NOTE — PROGRESS NOTES
Spoke with Dr. Bita Alberto concerning temperature 99.9, then 100.6, then 99.7, and now 101.8. C/o chills. Request sleeping aid. Reviewed PTA medication and MAR. New orders received.

## 2017-12-25 NOTE — PROGRESS NOTES
Report received from [facility]. Family members present. Respirations even and unlabored. No distress at this time. Sitter currently present for patient. Will continue to monitor.

## 2017-12-25 NOTE — PROGRESS NOTES
Bedside report to [facility], RN. Pt awake and upset about sitter and suicide precautions. Pt denies thoughts of suicide but aware of assessment made by psych consult. Pt encouraged to discuss with rounding MD today. IV site clear.

## 2017-12-26 LAB
ANION GAP SERPL CALC-SCNC: 10 MMOL/L (ref 7–16)
BASOPHILS # BLD: 0 K/UL (ref 0–0.2)
BASOPHILS NFR BLD: 0 % (ref 0–2)
BUN SERPL-MCNC: 4 MG/DL (ref 6–23)
CALCIUM SERPL-MCNC: 7.7 MG/DL (ref 8.3–10.4)
CHLORIDE SERPL-SCNC: 110 MMOL/L (ref 98–107)
CO2 SERPL-SCNC: 23 MMOL/L (ref 21–32)
CREAT SERPL-MCNC: 0.4 MG/DL (ref 0.6–1)
DIFFERENTIAL METHOD BLD: ABNORMAL
EOSINOPHIL # BLD: 0.1 K/UL (ref 0–0.8)
EOSINOPHIL NFR BLD: 1 % (ref 0.5–7.8)
ERYTHROCYTE [DISTWIDTH] IN BLOOD BY AUTOMATED COUNT: 12.6 % (ref 11.9–14.6)
GLUCOSE SERPL-MCNC: 114 MG/DL (ref 65–100)
HCT VFR BLD AUTO: 31.7 % (ref 35.8–46.3)
HGB BLD-MCNC: 10.6 G/DL (ref 11.7–15.4)
IMM GRANULOCYTES # BLD: 0 K/UL (ref 0–0.5)
IMM GRANULOCYTES NFR BLD AUTO: 0 % (ref 0–5)
LYMPHOCYTES # BLD: 1.1 K/UL (ref 0.5–4.6)
LYMPHOCYTES NFR BLD: 11 % (ref 13–44)
MCH RBC QN AUTO: 32.2 PG (ref 26.1–32.9)
MCHC RBC AUTO-ENTMCNC: 33.4 G/DL (ref 31.4–35)
MCV RBC AUTO: 96.4 FL (ref 79.6–97.8)
MONOCYTES # BLD: 0.7 K/UL (ref 0.1–1.3)
MONOCYTES NFR BLD: 7 % (ref 4–12)
NEUTS SEG # BLD: 8.1 K/UL (ref 1.7–8.2)
NEUTS SEG NFR BLD: 81 % (ref 43–78)
PLATELET # BLD AUTO: 232 K/UL (ref 150–450)
PMV BLD AUTO: 10.8 FL (ref 10.8–14.1)
POTASSIUM SERPL-SCNC: 3.7 MMOL/L (ref 3.5–5.1)
RBC # BLD AUTO: 3.29 M/UL (ref 4.05–5.25)
SODIUM SERPL-SCNC: 143 MMOL/L (ref 136–145)
WBC # BLD AUTO: 10 K/UL (ref 4.3–11.1)

## 2017-12-26 PROCEDURE — 74011250636 HC RX REV CODE- 250/636: Performed by: EMERGENCY MEDICINE

## 2017-12-26 PROCEDURE — 85025 COMPLETE CBC W/AUTO DIFF WBC: CPT | Performed by: NURSE PRACTITIONER

## 2017-12-26 PROCEDURE — 80048 BASIC METABOLIC PNL TOTAL CA: CPT | Performed by: NURSE PRACTITIONER

## 2017-12-26 PROCEDURE — 74011250637 HC RX REV CODE- 250/637: Performed by: INTERNAL MEDICINE

## 2017-12-26 PROCEDURE — 36415 COLL VENOUS BLD VENIPUNCTURE: CPT | Performed by: NURSE PRACTITIONER

## 2017-12-26 PROCEDURE — 74011250637 HC RX REV CODE- 250/637: Performed by: NURSE PRACTITIONER

## 2017-12-26 PROCEDURE — 74011000250 HC RX REV CODE- 250: Performed by: NURSE PRACTITIONER

## 2017-12-26 PROCEDURE — 74011250636 HC RX REV CODE- 250/636: Performed by: INTERNAL MEDICINE

## 2017-12-26 PROCEDURE — 74011250636 HC RX REV CODE- 250/636: Performed by: NURSE PRACTITIONER

## 2017-12-26 PROCEDURE — 74011000250 HC RX REV CODE- 250: Performed by: INTERNAL MEDICINE

## 2017-12-26 PROCEDURE — 65270000029 HC RM PRIVATE

## 2017-12-26 RX ORDER — GUAIFENESIN 600 MG/1
600 TABLET, EXTENDED RELEASE ORAL EVERY 12 HOURS
Status: DISCONTINUED | OUTPATIENT
Start: 2017-12-26 | End: 2017-12-28 | Stop reason: HOSPADM

## 2017-12-26 RX ORDER — UREA 10 %
2 LOTION (ML) TOPICAL 2 TIMES DAILY
Status: DISCONTINUED | OUTPATIENT
Start: 2017-12-26 | End: 2017-12-28 | Stop reason: HOSPADM

## 2017-12-26 RX ORDER — IBUPROFEN 600 MG/1
600 TABLET ORAL
Status: DISCONTINUED | OUTPATIENT
Start: 2017-12-26 | End: 2017-12-28 | Stop reason: HOSPADM

## 2017-12-26 RX ADMIN — POTASSIUM CHLORIDE 40 MEQ: 20 TABLET, EXTENDED RELEASE ORAL at 17:05

## 2017-12-26 RX ADMIN — GUAIFENESIN 600 MG: 600 TABLET, EXTENDED RELEASE ORAL at 10:35

## 2017-12-26 RX ADMIN — POTASSIUM & SODIUM PHOSPHATES POWDER PACK 280-160-250 MG 1 PACKET: 280-160-250 PACK at 12:59

## 2017-12-26 RX ADMIN — POTASSIUM & SODIUM PHOSPHATES POWDER PACK 280-160-250 MG 1 PACKET: 280-160-250 PACK at 21:09

## 2017-12-26 RX ADMIN — ACETAMINOPHEN 650 MG: 325 TABLET ORAL at 08:40

## 2017-12-26 RX ADMIN — CEFTRIAXONE SODIUM 1 G: 1 INJECTION, POWDER, FOR SOLUTION INTRAMUSCULAR; INTRAVENOUS at 10:36

## 2017-12-26 RX ADMIN — URINARY PAIN RELIEF 190 MG: 95 TABLET ORAL at 08:36

## 2017-12-26 RX ADMIN — POTASSIUM CHLORIDE 40 MEQ: 20 TABLET, EXTENDED RELEASE ORAL at 08:40

## 2017-12-26 RX ADMIN — Medication 5 ML: at 13:01

## 2017-12-26 RX ADMIN — POTASSIUM & SODIUM PHOSPHATES POWDER PACK 280-160-250 MG 1 PACKET: 280-160-250 PACK at 08:40

## 2017-12-26 RX ADMIN — ENOXAPARIN SODIUM 30 MG: 30 INJECTION SUBCUTANEOUS at 08:41

## 2017-12-26 RX ADMIN — POTASSIUM & SODIUM PHOSPHATES POWDER PACK 280-160-250 MG 1 PACKET: 280-160-250 PACK at 17:06

## 2017-12-26 RX ADMIN — Medication 10 ML: at 21:09

## 2017-12-26 RX ADMIN — IBUPROFEN 600 MG: 600 TABLET, FILM COATED ORAL at 21:17

## 2017-12-26 RX ADMIN — SODIUM CHLORIDE 100 ML/HR: 900 INJECTION, SOLUTION INTRAVENOUS at 01:17

## 2017-12-26 RX ADMIN — LACTOBACILLUS TAB 2 TABLET: TAB at 21:09

## 2017-12-26 RX ADMIN — Medication 1 AMPULE: at 21:11

## 2017-12-26 RX ADMIN — GUAIFENESIN 600 MG: 600 TABLET, EXTENDED RELEASE ORAL at 21:09

## 2017-12-26 RX ADMIN — LACTOBACILLUS TAB 2 TABLET: TAB at 10:35

## 2017-12-26 RX ADMIN — FOLIC ACID: 5 INJECTION, SOLUTION INTRAMUSCULAR; INTRAVENOUS; SUBCUTANEOUS at 10:53

## 2017-12-26 RX ADMIN — ACETAMINOPHEN 650 MG: 325 TABLET ORAL at 17:07

## 2017-12-26 RX ADMIN — URINARY PAIN RELIEF 190 MG: 95 TABLET ORAL at 17:05

## 2017-12-26 RX ADMIN — Medication 1 AMPULE: at 08:42

## 2017-12-26 RX ADMIN — Medication 5 ML: at 05:50

## 2017-12-26 NOTE — PROGRESS NOTES
Pt resting in bed, family present at bedside. No further complaints voiced. Call light in reach. Banana bag infusing via L forearm IV at 125ml/hr.  Call light in reach

## 2017-12-26 NOTE — PROGRESS NOTES
Pt requesting family present for psych consult, awaiting time frame from pt for family to be present.

## 2017-12-26 NOTE — PROGRESS NOTES
Pt alert and oriented times 4. Pt up in room, gait steady. Pt IV to L arm infusing banana bag 125ml/hr. Pt denies pain/distress. Family/sitter present.  Call light in reach

## 2017-12-26 NOTE — PROGRESS NOTES
Pt alert and oriented times 4. Pt in bed resting quietly, Pt states pain 5/10 to groin area, states \"I am sore\". L forearm IV infusing with NS 100ml/hr. No SOB noted, respirations even/unlabored.  Sitter at bedside

## 2017-12-26 NOTE — PROGRESS NOTES
Patient has been stable this shift. Sitter has been at bedside. Patient has  Rested peacefully with no c/o pain, discomfort or shortness of breath. No reports of patient wanting to cause harm to herself. Report to be given to oncoming nurse.

## 2017-12-26 NOTE — PROGRESS NOTES
Hospitalist Progress Note    Subjective:   Daily Progress Note: 12/26/2017 8:01 AM    Patient is a 46 y.o.  female who presented to ER about 2200 on 12/23 after daughter who lives with her found her unresponsive after drinking unknown amounts of white wine and taking unknown amounts of unknown type pills. Daughter reports patient emotional of late due to relationship breakup, and believes there to be suicidal intent. States patient has attempted to commit suicide in the past.  Psych MD notes state patient has not attempted, just contemplated. She also has history of alcohol abuse with 1/30 days sober recently. She has participated in prior community programs. She was unresponsive with periods of apnea on admission. Daughter believes pills were taken about 1.5 hours PTA, patient last used cell phone at that time. Daughter found 2 empty bottles of ambien and one of norco. Narcan was given without improvement. ETOH level: 198. Hypotensive to 69/44. An ETT tube was placed in ERand patient placed on vent. Activated charcoal was given, begun on banana bag. Admitted per Intensivist to ICU. Agitation noted early 12/24. Sedatives given. Blood pressure remained marginal.  Extubated at 0900 12/24. Remained drowsy. Reports unintentional weight loss also causing anxiety, #10 in past three months, from 110-115 down to 97. Swears she is eating normally. Transferred to floor with sitter 12/24 afternoon. Crying a lot. Wants to smoke, nicotine patch placed. 12/24/ pm:  Temp up to 101.8 at 1905. Blood and urine cultures pending. Negative flu screen. No additional labs or x-rays ordered. Psych mary reviewed with patient being high safety risk and recommendations for involuntary commitment and use of atarax prn.     12/25:  Hospitalist service to assume care. Painfully thin.  Temp jumped to 103.3 this am.  Currently upset regarding sitter, suicide precautions, commitment, , constipation, fatty cyst on arm, weight loss, UTI symptoms, etc.  Discussing anything other than why she is here. Informed her the weight loss and fatty cyst on arm are outpatient issues. She is apparently in the process of workup with colonoscopy 11/17 per Dr Chapin Stephens, negative other than polyps. Did not get CT abdomen ordered due to insurance refusal to pay. No clear cause for fever other than exposure to URI at work 4 days ago. WBC normal, chemistry normal with the exception of potassium of 3.2 and phos of  2.1, calcium 7.9, glucose 117. CXR pending at present. No BM x 3 days. Requesting stool softener. States she knows she has a UTI, Requesting antibiotic. Informed her urinalysis is negative. Culture pending. 1/26:  Tmax x 24 hours: 99.3. T max 103.3 yesterday without clearcut reason until preliminary urine culture back today with 50,000-100,000 colonies/ml gram negative rods. Urinalysis negative. Adding rocephin. Old records indicate patient repeatedly has bacterial vaginosis. Blood cultures [facility] Case management attempting to secure placement. Ensure enlive tid added to diet. Unknown to me, patient was apparently not aware she is being committed to a psych facility. Very upset upon hearing this news, states she has to work, has multiple things to take care of.  Just got a new job,  states she has no intention of harming herself, nor did she at the time of overdose. She states she is an alcoholic in remission x 24 days, and then was disgusted as she could not sleep so she took a total of 20 mg ambien and drank approx 2 bottles of wine. Informed her that the system is designed to protect her and in a case of overdose staff has two choices, commit to psych facility or clear for discharge. Only a Psychiatrist can determine which course of action is most appropriate for the patient.   Entire time we are discussing this, patient is frantically on phone attempting to call daughter at work as she states she will not speak with psychiatrist unless her daughter is here as daughter was present at the time of the incident. Repetitively stating she is stressed out. Has a counselor through her [facility], unclear if this person is licensed. Also states her PHP \"knows me,\" and will assist with what is needed. Took zoloft once, states it made her a zombie. No prior inpatient psych care. No prior overdose or suicide gesture per patient. Seems to depend on daughter, daughter's S/O (lives with them) and mother for multiple needs. Difficult for patient to stay on task, self absorbed. Does not give practitioner time to answer questions prior to asking another. Frequent change of topic. Has asked multiple times about a fatty cyst on arm and her weight loss. Exaggerated reporting. Current Facility-Administered Medications   Medication Dose Route Frequency    acetaminophen (TYLENOL) tablet 650 mg  650 mg Oral Q4H PRN    potassium chloride (K-DUR, KLOR-CON) SR tablet 40 mEq  40 mEq Oral BID    potassium, sodium phosphates (NEUTRA-PHOS) packet 1 Packet  1 Packet Oral QID    hydrOXYzine pamoate (VISTARIL) capsule 50 mg  50 mg Oral Q6H PRN    docusate sodium (COLACE) capsule 100 mg  100 mg Oral BID PRN    phenazopyridine (PYRIDIUM) tab 190 mg  190 mg Oral BID    sodium chloride (NS) flush 5-10 mL  5-10 mL IntraVENous Q8H    sodium chloride (NS) flush 5-10 mL  5-10 mL IntraVENous PRN    enoxaparin (LOVENOX) injection 30 mg  30 mg SubCUTAneous Q24H    0.9% sodium chloride infusion  100 mL/hr IntraVENous CONTINUOUS    0.9% sodium chloride 1,000 mL with mvi, adult no. 4 with vit K 10 mL, thiamine 121 mg, folic acid 1 mg infusion   IntraVENous Q24H    alcohol 62% (NOZIN) nasal  1 Ampule  1 Ampule Topical Q12H    nicotine (NICODERM CQ) 14 mg/24 hr patch 1 Patch  1 Patch TransDERmal DAILY    zolpidem (AMBIEN) tablet 5 mg  5 mg Oral QHS PRN    0.9% sodium chloride infusion 1,000 mL  1,000 mL IntraVENous CONTINUOUS Review of Systems  A comprehensive review of systems was negative except for that written in the HPI. Objective:     Visit Vitals    /74 (BP 1 Location: Right arm, BP Patient Position: At rest)    Pulse 78    Temp 99.2 °F (37.3 °C)    Resp 18    Ht 5' 7.5\" (1.715 m)    Wt 45.8 kg (100 lb 14.4 oz)    SpO2 95%    BMI 15.57 kg/m2    O2 Flow Rate (L/min): 2 l/min O2 Device: Room air    12/24 1901 - 12/26 0700  In: 1898 [P.O.:660; I.V.:1238]  Out: -     General appearance: Awake, alert, oriented, extremely anxious with multiple issues she wants to discuss and almost frenetic movements and speech. Repeatedly picking up phone to use while NP in room. Poor eye contact. Suspect underlying disorder that has not been diagnosed. See above for long discussion. Extremely thin, obsessed with reason for same as she states she eats a lot. See note per dietician. Head: Normocephalic, without obvious abnormality, atraumatic  Neck: supple, symmetrical, trachea midline, no JVD  Lungs: clear to auscultation bilaterally  Heart: regular rate and rhythm, S1, S2 normal, no murmur, click, rub or gallop  Abdomen: soft, non-tender. Bowel sounds normal. No masses,  no organomegaly. Continues to complain of dysuria. Extremities: extremities normal, atraumatic, no cyanosis or edema  Skin: Skin color, texture, turgor normal dulled and dry. No rashes or lesions  Neurologic: Grossly normal, no unilateral deficit.      Additional comments:  Notes, orders, test results, vitals reviewed.      Data Review  Recent Results (from the past 24 hour(s))   EKG, 12 LEAD, INITIAL    Collection Time: 12/25/17  9:33 AM   Result Value Ref Range    Ventricular Rate 77 BPM    Atrial Rate 77 BPM    P-R Interval 140 ms    QRS Duration 86 ms    Q-T Interval 384 ms    QTC Calculation (Bezet) 434 ms    Calculated P Axis 76 degrees    Calculated R Axis 90 degrees    Calculated T Axis 60 degrees    Diagnosis       Normal sinus rhythm  Rightward axis  Borderline ECG  When compared with ECG of 23-DEC-2017 23:21,  No significant change was found  Confirmed by Alessandra Casarez ([id number]) on 12/25/2017 5:78:23 PM     METABOLIC PANEL, COMPREHENSIVE    Collection Time: 12/25/17 11:31 AM   Result Value Ref Range    Sodium 143 136 - 145 mmol/L    Potassium 3.2 (L) 3.5 - 5.1 mmol/L    Chloride 111 (H) 98 - 107 mmol/L    CO2 26 21 - 32 mmol/L    Anion gap 6 (L) 7 - 16 mmol/L    Glucose 117 (H) 65 - 100 mg/dL    BUN 4 (L) 6 - 23 MG/DL    Creatinine 0.46 (L) 0.6 - 1.0 MG/DL    GFR est AA >60 >60 ml/min/1.73m2    GFR est non-AA >60 >60 ml/min/1.73m2    Calcium 7.9 (L) 8.3 - 10.4 MG/DL    Bilirubin, total 0.4 0.2 - 1.1 MG/DL    ALT (SGPT) 15 12 - 65 U/L    AST (SGOT) 12 (L) 15 - 37 U/L    Alk.  phosphatase 76 50 - 136 U/L    Protein, total 5.9 (L) 6.3 - 8.2 g/dL    Albumin 2.8 (L) 3.5 - 5.0 g/dL    Globulin 3.1 2.3 - 3.5 g/dL    A-G Ratio 0.9 (L) 1.2 - 3.5     MAGNESIUM    Collection Time: 12/25/17 11:31 AM   Result Value Ref Range    Magnesium 2.0 1.8 - 2.4 mg/dL   PHOSPHORUS    Collection Time: 12/25/17 11:31 AM   Result Value Ref Range    Phosphorus 2.1 (L) 2.5 - 4.5 MG/DL   URINALYSIS W/ RFLX MICROSCOPIC    Collection Time: 12/25/17 11:55 AM   Result Value Ref Range    Color YELLOW      Appearance CLEAR      Specific gravity 1.001 1.001 - 1.023      pH (UA) 7.0 5.0 - 9.0      Protein NEGATIVE  NEG mg/dL    Glucose NEGATIVE  mg/dL    Ketone NEGATIVE  NEG mg/dL    Bilirubin NEGATIVE  NEG      Blood NEGATIVE  NEG      Urobilinogen 0.2 0.2 - 1.0 EU/dL    Nitrites NEGATIVE  NEG      Leukocyte Esterase NEGATIVE  NEG     CBC WITH AUTOMATED DIFF    Collection Time: 12/25/17 12:18 PM   Result Value Ref Range    WBC 8.6 4.3 - 11.1 K/uL    RBC 3.45 (L) 4.05 - 5.25 M/uL    HGB 11.4 (L) 11.7 - 15.4 g/dL    HCT 33.0 (L) 35.8 - 46.3 %    MCV 95.7 79.6 - 97.8 FL    MCH 33.0 (H) 26.1 - 32.9 PG    MCHC 34.5 31.4 - 35.0 g/dL    RDW 12.5 11.9 - 14.6 %    PLATELET 690 903 - 450 K/uL    MPV 10.1 (L) 10.8 - 14.1 FL    DF AUTOMATED      NEUTROPHILS 78 43 - 78 %    LYMPHOCYTES 13 13 - 44 %    MONOCYTES 9 4.0 - 12.0 %    EOSINOPHILS 0 (L) 0.5 - 7.8 %    BASOPHILS 0 0.0 - 2.0 %    IMMATURE GRANULOCYTES 0 0.0 - 5.0 %    ABS. NEUTROPHILS 6.6 1.7 - 8.2 K/UL    ABS. LYMPHOCYTES 1.2 0.5 - 4.6 K/UL    ABS. MONOCYTES 0.8 0.1 - 1.3 K/UL    ABS. EOSINOPHILS 0.0 0.0 - 0.8 K/UL    ABS. BASOPHILS 0.0 0.0 - 0.2 K/UL    ABS. IMM. GRANS. 0.0 0.0 - 0.5 K/UL   LACTIC ACID    Collection Time: 12/25/17 12:18 PM   Result Value Ref Range    Lactic acid 0.8 0.4 - 2.0 MMOL/L   PROCALCITONIN    Collection Time: 12/25/17 12:18 PM   Result Value Ref Range    Procalcitonin <5.6 ng/mL   METABOLIC PANEL, BASIC    Collection Time: 12/26/17  7:13 AM   Result Value Ref Range    Sodium 143 136 - 145 mmol/L    Potassium 3.7 3.5 - 5.1 mmol/L    Chloride 110 (H) 98 - 107 mmol/L    CO2 23 21 - 32 mmol/L    Anion gap 10 7 - 16 mmol/L    Glucose 114 (H) 65 - 100 mg/dL    BUN 4 (L) 6 - 23 MG/DL    Creatinine 0.40 (L) 0.6 - 1.0 MG/DL    GFR est AA >60 >60 ml/min/1.73m2    GFR est non-AA >60 >60 ml/min/1.73m2    Calcium 7.7 (L) 8.3 - 10.4 MG/DL     12/24:  CXR:  Lungs are hyperinflated and clear. Heart and pulmonary vasculature are unremarkable. ET tube and NG tube been removed. IMPRESSION: Negative for acute abnormality. Hyperinflation suggesting emphysema.      12/24:  CT HEAD:    BRAIN:      -  There are no early signs of territorial or lacunar infarction by CT.     -  No intracranial mass, hematoma, or hydrocephalus.      -  No gross white matter abnormality by CT.    VISUALIZED PARANASAL SINUSES: Chronic bilateral ethmoid as well as right maxillary sinusitis. MASTOIDS:  Clear. CALVARIUM AND SCALP: Unremarkable. IMPRESSION:  Unremarkable brain CT without intravenous contrast. R graph chronic sinusitis.     Assessment/Plan:   Altered mental status  Anxiety  Suicidal overdose  Alcohol intoxication   FUO 12/24/17  History of prior suicide attempts  Long standing tobacco abuse  History of alcohol abuse was sober x 24 days PTA  Exaggerated speech and movements  Hypokalemia:  corrected  Hypophosphatemia:  corrected  Severe protein calorie malnutrition with BMI of 15:  Dietician on board  Unintentional weight loss #10 in 3 months  Anemia  Dysuria  Gram negative rods UTI with normal urinalysis    PLAN:   Will have telepsych re-evaluate patient, not suicidal at this juncture, patient insists daughter be present.    Case Management on board attempting to secure placement in Psych facility  Adding rocephin daily with pharmacy to dose  Adding probiotic  Will keep IVF going another day  Mucinex  Alternate ibuprofen with tylenol per patient request.    Care Plan discussed with:   Patient, MD, RN, Case management     Signed By: Rebecca Riojas NP     December 26, 2017

## 2017-12-26 NOTE — PROGRESS NOTES
Patient currently resting in bed. Sitter at bedside. Precautionary measures in place. No complaints of pain at this time. Denies SOB, dizziness, and dyspnea. Respirations even and unlabored. Safety measures in place. Will continue to monitor.

## 2017-12-26 NOTE — PROGRESS NOTES
BSR from Nayana Laureano, KIKI Patient resting in bed with eyes closed. O2 room air. resp even and unlabored. Responds to verbal stimuli. IVF infusing to left arm IV site. Denies pain/discomfort. Sitter at bedside, door open. No distress noted.

## 2017-12-26 NOTE — PROGRESS NOTES
Pt complaints of generalized body aches 5/10 and received tylenol 650mg po at 1707. Dr Tabitha Estrada with psych called prior to consult with pt via tele and after and Dr. Tabitha Estrada states, I am going to recommend to let her go home. Pt anticipating discharge tomorrow.

## 2017-12-27 LAB
BACTERIA SPEC CULT: ABNORMAL
SERVICE CMNT-IMP: ABNORMAL

## 2017-12-27 PROCEDURE — 74011250637 HC RX REV CODE- 250/637: Performed by: NURSE PRACTITIONER

## 2017-12-27 PROCEDURE — 65270000029 HC RM PRIVATE

## 2017-12-27 PROCEDURE — 74011000250 HC RX REV CODE- 250: Performed by: INTERNAL MEDICINE

## 2017-12-27 PROCEDURE — 74011250637 HC RX REV CODE- 250/637: Performed by: INTERNAL MEDICINE

## 2017-12-27 PROCEDURE — 74011000250 HC RX REV CODE- 250: Performed by: NURSE PRACTITIONER

## 2017-12-27 PROCEDURE — 74011250636 HC RX REV CODE- 250/636: Performed by: INTERNAL MEDICINE

## 2017-12-27 PROCEDURE — 74011250636 HC RX REV CODE- 250/636: Performed by: NURSE PRACTITIONER

## 2017-12-27 RX ORDER — MIRTAZAPINE 15 MG/1
15 TABLET, FILM COATED ORAL
Status: DISCONTINUED | OUTPATIENT
Start: 2017-12-27 | End: 2017-12-28 | Stop reason: HOSPADM

## 2017-12-27 RX ORDER — IBUPROFEN 200 MG
1 TABLET ORAL DAILY
Status: DISCONTINUED | OUTPATIENT
Start: 2017-12-27 | End: 2017-12-28 | Stop reason: HOSPADM

## 2017-12-27 RX ADMIN — POTASSIUM CHLORIDE 40 MEQ: 20 TABLET, EXTENDED RELEASE ORAL at 09:08

## 2017-12-27 RX ADMIN — POTASSIUM & SODIUM PHOSPHATES POWDER PACK 280-160-250 MG 1 PACKET: 280-160-250 PACK at 12:51

## 2017-12-27 RX ADMIN — URINARY PAIN RELIEF 190 MG: 95 TABLET ORAL at 09:07

## 2017-12-27 RX ADMIN — Medication 10 ML: at 21:19

## 2017-12-27 RX ADMIN — Medication 5 ML: at 15:09

## 2017-12-27 RX ADMIN — ENOXAPARIN SODIUM 30 MG: 30 INJECTION SUBCUTANEOUS at 09:08

## 2017-12-27 RX ADMIN — ACETAMINOPHEN 650 MG: 325 TABLET ORAL at 15:08

## 2017-12-27 RX ADMIN — LACTOBACILLUS TAB 2 TABLET: TAB at 09:07

## 2017-12-27 RX ADMIN — LACTOBACILLUS TAB 2 TABLET: TAB at 21:19

## 2017-12-27 RX ADMIN — Medication 10 ML: at 05:22

## 2017-12-27 RX ADMIN — POTASSIUM & SODIUM PHOSPHATES POWDER PACK 280-160-250 MG 1 PACKET: 280-160-250 PACK at 17:54

## 2017-12-27 RX ADMIN — POTASSIUM & SODIUM PHOSPHATES POWDER PACK 280-160-250 MG 1 PACKET: 280-160-250 PACK at 12:50

## 2017-12-27 RX ADMIN — SODIUM CHLORIDE 100 ML/HR: 900 INJECTION, SOLUTION INTRAVENOUS at 09:05

## 2017-12-27 RX ADMIN — Medication 1 AMPULE: at 21:19

## 2017-12-27 RX ADMIN — Medication 1 AMPULE: at 09:06

## 2017-12-27 RX ADMIN — POTASSIUM CHLORIDE 40 MEQ: 20 TABLET, EXTENDED RELEASE ORAL at 17:53

## 2017-12-27 RX ADMIN — GUAIFENESIN 600 MG: 600 TABLET, EXTENDED RELEASE ORAL at 09:08

## 2017-12-27 RX ADMIN — GUAIFENESIN 600 MG: 600 TABLET, EXTENDED RELEASE ORAL at 21:19

## 2017-12-27 RX ADMIN — FOLIC ACID: 5 INJECTION, SOLUTION INTRAMUSCULAR; INTRAVENOUS; SUBCUTANEOUS at 15:08

## 2017-12-27 RX ADMIN — ACETAMINOPHEN 650 MG: 325 TABLET ORAL at 21:29

## 2017-12-27 RX ADMIN — MIRTAZAPINE 15 MG: 15 TABLET, FILM COATED ORAL at 21:19

## 2017-12-27 RX ADMIN — CEFTRIAXONE SODIUM 1 G: 1 INJECTION, POWDER, FOR SOLUTION INTRAMUSCULAR; INTRAVENOUS at 09:08

## 2017-12-27 RX ADMIN — POTASSIUM & SODIUM PHOSPHATES POWDER PACK 280-160-250 MG 1 PACKET: 280-160-250 PACK at 21:19

## 2017-12-27 RX ADMIN — URINARY PAIN RELIEF 190 MG: 95 TABLET ORAL at 17:54

## 2017-12-27 RX ADMIN — ACETAMINOPHEN 650 MG: 325 TABLET ORAL at 06:01

## 2017-12-27 NOTE — PROGRESS NOTES
Initial visit made to patient and a prayer was provided. A  card was left. Patient asked for a verse for today which was supplied. She also, showed the  her new Bible.         [employer]
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
## 2017-12-27 NOTE — PROGRESS NOTES
Hospitalist Progress Note    2017  Admit Date: 2017 10:08 PM   NAME: Misael Smith   :  1965   MRN:  [id number]   Attending: Abdi Goodrich MD  PCP:  Dileep Nicholson MD    SUBJECTIVE:     Misael Smith is a 49yoF with hx of depression and ETOH abuse who was admitted on  after being found unresponsive from an overdose (ambien and etoh). She was intubated and placed in the ICU. Initially placed on commitment papers, but psych has since evaluated and believe this to be an unintentional overdose. As she is not currently endorsing SI or HI, they believe she no longer needs suicide precautions. Hospitalization complicated by UTI.     : patient is very worried about her fever of \"102\" this morning. I later confirmed with the nurse that she had a temp of 100.2 not 102. She thinks she is too sick to go home and is very preoccupied with when she will receive her medications. She denies SI/HI. Also concerned about her weight loss, which is unintentional, but likely related to poor po intake from poor fitting partial dentures, depression and ETOH abuse. Review of Systems negative with exception of pertinent positives noted above      PHYSICAL EXAM       Visit Vitals    /72    Pulse 75    Temp 98.8 °F (37.1 °C)    Resp 18    Ht 5' 7.5\" (1.715 m)    Wt 44.1 kg (97 lb 3.2 oz)    SpO2 97%    BMI 15 kg/m2      Temp (24hrs), Av.7 °F (37.1 °C), Min:98.2 °F (36.8 °C), Max:99 °F (37.2 °C)    Oxygen Therapy  O2 Sat (%): 97 % (17 1120)  Pulse via Oximetry: 90 beats per minute (17 1120)  O2 Device: Room air (17 7515)  O2 Flow Rate (L/min): 2 l/min (17 0911)  FIO2 (%): 31 % (17 0837)    Intake/Output Summary (Last 24 hours) at 17 1359  Last data filed at 17 1025   Gross per 24 hour   Intake              440 ml   Output             1950 ml   Net            -1510 ml          General: No acute distress.   Head:  Atraumatic Normocephalic. Lungs:  CTA Bilaterally. CVS:  Regular rate and rhythm,  No murmur, rub, or gallop, No JVD, No lower   extremity edema. Abdomen: Soft, Non distended, Non tender, Positive bowel sounds. MSK:  No deformities, lesions, Spontaneously moves extremities. Neurologic:  AOx3. No focal deficits  Psychiatry:      No anxiety/Depression  Skin:   No rash/lesions. Good skin turgor  Heme/Lymph/Immune:  No petechiae, ecchymoses, overt signs of bleeding or    lymphadenopathy noted. No results found for this or any previous visit (from the past 24 hour(s)). Imaging /Procedures /Studies   XR CHEST PA LAT   Final Result   IMPRESSION: Negative for acute abnormality. Hyperinflation suggesting emphysema. CT HEAD WO CONT   Final Result   IMPRESSION:      Unremarkable brain CT without intravenous contrast. R graph chronic sinusitis. Date of Dictation: 12/24/2017 3:16 AM         XR CHEST PORT   Final Result   IMPRESSION: No acute cardiomegaly disease. Recommend advancing the orogastric tube further into the stomach.                   ASSESSMENT      Hospital Problems as of 12/27/2017  Date Reviewed: 6/6/2017          Codes Class Noted - Resolved POA    Protein-calorie malnutrition, severe ([address] ICD-10-CM: E43  ICD-9-CM: 262  12/25/2017 - Present Unknown        * (Principal)Altered mental status ICD-10-CM: R41.82  ICD-9-CM: 780.97  12/24/2017 - Present Unknown        Suicidal overdose ([address] ICD-10-CM: C09.288Y  ICD-9-CM: 977.9, E950.5  12/24/2017 - Present Unknown        Alcohol intoxication ([facility] ICD-10-CM: G27.857  ICD-9-CM: 305.00  12/24/2017 - Present Unknown                  Plan:  - AMS:  2/2 unintentional overdose  Resolved    - Overdose:  Psych evaluated yesterday and took off papers  DC sitter and suicide precautions    - ETOH abuse:  Continue to monitor closely for ETOH withdrawal    - Weight loss:  Multifactorial from poor po intake, ETOH abuse and depression  Start remeron QHS    - UTI:   Continue rocephin and IVF  Urine cx pending    DVT Prophylaxis: Lovenox  Dispo: home tomorrow    Mary Wayne MD

## 2017-12-27 NOTE — PROGRESS NOTES
Medicated with 650mg Tylenol PO for oral temperature of 99.3. Complaining of generalized body pain. IV Multivitamin fluids started at 125 ml/hr.

## 2017-12-27 NOTE — PROGRESS NOTES
Bedside sitter for Suicide precautions discontinued per MD order. Patient sitting up in bed with television on, talking on telephone. IV Fluids of NS continue to infuse at 100 ml/hr. Will continue to monitor.

## 2017-12-27 NOTE — PROGRESS NOTES
END OF SHIFT NOTE:    Intake/Output  12/26 1901 - 12/27 0700  In: -   Out: 1950 [Urine:1950]   Voiding: yes  Catheter: no  Drain:          Stool:  no occurrences. Emesis:  0 occurrences. VITAL SIGNS  Patient Vitals for the past 12 hrs:   Temp Pulse Resp BP SpO2   12/27/17 0300 98.9 °F (37.2 °C) 79 18 105/60 96 %   12/26/17 2313 98.2 °F (36.8 °C) 78 20 127/63 93 %   12/26/17 1930 98.3 °F (36.8 °C) 83 20 138/83 96 %       Pain Assessment  Pain 1  Pain Scale 1: Visual (12/27/17 0255)  Pain Intensity 1: 0 (12/27/17 0255)  Patient Stated Pain Goal: 0 (12/27/17 0255)  Pain Reassessment 1: Other (comment) (12/27/17 0255)  Pain Onset 1:  (bladder pain) (12/25/17 1909)  Pain Location 1: Generalized (12/26/17 1700)  Pain Description 1: Aching (12/26/17 1700)  Pain Intervention(s) 1: Medication (see MAR) (12/26/17 1700)    Ambulating  yes    Additional Information: PT has a sitter at the door. Pt is alert and oriented. PT ran a temperature of 102 this am. Tylenol given. Shift report given to oncoming nurse at the bedside.     Lilia Rg RN

## 2017-12-27 NOTE — PROGRESS NOTES
Morning bedside rounding report received from Kike Sharpe RN. Patient sitting up in bed, on her iPhone. IV Fluids of NS infusing at 100 ml/hr via hepwell in her left arm. Voices complaints of pressure on her bladder during urination. Asking about p;ossible discharge to home today. Will continue to monitor.

## 2017-12-28 VITALS
DIASTOLIC BLOOD PRESSURE: 67 MMHG | RESPIRATION RATE: 17 BRPM | HEART RATE: 93 BPM | BODY MASS INDEX: 14.84 KG/M2 | WEIGHT: 97.9 LBS | OXYGEN SATURATION: 96 % | TEMPERATURE: 99.1 F | HEIGHT: 68 IN | SYSTOLIC BLOOD PRESSURE: 102 MMHG

## 2017-12-28 LAB
BASOPHILS # BLD: 0 K/UL (ref 0–0.2)
BASOPHILS NFR BLD: 0 % (ref 0–2)
DIFFERENTIAL METHOD BLD: ABNORMAL
EOSINOPHIL # BLD: 0.2 K/UL (ref 0–0.8)
EOSINOPHIL NFR BLD: 1 % (ref 0.5–7.8)
ERYTHROCYTE [DISTWIDTH] IN BLOOD BY AUTOMATED COUNT: 12.7 % (ref 11.9–14.6)
HCT VFR BLD AUTO: 39.1 % (ref 35.8–46.3)
HGB BLD-MCNC: 13.5 G/DL (ref 11.7–15.4)
IMM GRANULOCYTES # BLD: 0 K/UL (ref 0–0.5)
IMM GRANULOCYTES NFR BLD AUTO: 0 % (ref 0–5)
LYMPHOCYTES # BLD: 1.8 K/UL (ref 0.5–4.6)
LYMPHOCYTES NFR BLD: 16 % (ref 13–44)
MCH RBC QN AUTO: 32.9 PG (ref 26.1–32.9)
MCHC RBC AUTO-ENTMCNC: 34.5 G/DL (ref 31.4–35)
MCV RBC AUTO: 95.4 FL (ref 79.6–97.8)
MONOCYTES # BLD: 1.5 K/UL (ref 0.1–1.3)
MONOCYTES NFR BLD: 14 % (ref 4–12)
NEUTS SEG # BLD: 7.6 K/UL (ref 1.7–8.2)
NEUTS SEG NFR BLD: 69 % (ref 43–78)
PLATELET # BLD AUTO: 305 K/UL (ref 150–450)
PMV BLD AUTO: 10.7 FL (ref 10.8–14.1)
RBC # BLD AUTO: 4.1 M/UL (ref 4.05–5.25)
WBC # BLD AUTO: 11.2 K/UL (ref 4.3–11.1)

## 2017-12-28 PROCEDURE — 90686 IIV4 VACC NO PRSV 0.5 ML IM: CPT | Performed by: INTERNAL MEDICINE

## 2017-12-28 PROCEDURE — 74011250637 HC RX REV CODE- 250/637: Performed by: INTERNAL MEDICINE

## 2017-12-28 PROCEDURE — 74011250636 HC RX REV CODE- 250/636: Performed by: NURSE PRACTITIONER

## 2017-12-28 PROCEDURE — 74011250637 HC RX REV CODE- 250/637: Performed by: NURSE PRACTITIONER

## 2017-12-28 PROCEDURE — 90471 IMMUNIZATION ADMIN: CPT | Performed by: NURSE PRACTITIONER

## 2017-12-28 PROCEDURE — 74011000250 HC RX REV CODE- 250: Performed by: NURSE PRACTITIONER

## 2017-12-28 PROCEDURE — 36415 COLL VENOUS BLD VENIPUNCTURE: CPT | Performed by: INTERNAL MEDICINE

## 2017-12-28 PROCEDURE — 85025 COMPLETE CBC W/AUTO DIFF WBC: CPT | Performed by: INTERNAL MEDICINE

## 2017-12-28 PROCEDURE — 74011250636 HC RX REV CODE- 250/636: Performed by: INTERNAL MEDICINE

## 2017-12-28 RX ORDER — MIRTAZAPINE 15 MG/1
15 TABLET, FILM COATED ORAL
Qty: 30 TAB | Refills: 11 | Status: SHIPPED | OUTPATIENT
Start: 2017-12-28 | End: 2018-07-11

## 2017-12-28 RX ORDER — IBUPROFEN 200 MG
1 TABLET ORAL DAILY
Qty: 21 PATCH | Refills: 0 | Status: SHIPPED | OUTPATIENT
Start: 2017-12-29 | End: 2018-01-19

## 2017-12-28 RX ORDER — CIPROFLOXACIN 500 MG/1
500 TABLET ORAL 2 TIMES DAILY
Qty: 14 TAB | Refills: 0 | Status: SHIPPED | OUTPATIENT
Start: 2017-12-28 | End: 2018-01-04

## 2017-12-28 RX ADMIN — LACTOBACILLUS TAB 2 TABLET: TAB at 08:56

## 2017-12-28 RX ADMIN — INFLUENZA VIRUS VACCINE 0.5 ML: 15; 15; 15; 15 SUSPENSION INTRAMUSCULAR at 11:39

## 2017-12-28 RX ADMIN — ENOXAPARIN SODIUM 30 MG: 30 INJECTION SUBCUTANEOUS at 08:57

## 2017-12-28 RX ADMIN — CEFTRIAXONE SODIUM 1 G: 1 INJECTION, POWDER, FOR SOLUTION INTRAMUSCULAR; INTRAVENOUS at 09:04

## 2017-12-28 RX ADMIN — Medication 1 AMPULE: at 08:57

## 2017-12-28 RX ADMIN — Medication 10 ML: at 06:21

## 2017-12-28 RX ADMIN — POTASSIUM & SODIUM PHOSPHATES POWDER PACK 280-160-250 MG 1 PACKET: 280-160-250 PACK at 08:55

## 2017-12-28 RX ADMIN — POTASSIUM CHLORIDE 40 MEQ: 20 TABLET, EXTENDED RELEASE ORAL at 08:55

## 2017-12-28 RX ADMIN — GUAIFENESIN 600 MG: 600 TABLET, EXTENDED RELEASE ORAL at 08:55

## 2017-12-28 RX ADMIN — ACETAMINOPHEN 650 MG: 325 TABLET ORAL at 03:31

## 2017-12-28 NOTE — PROGRESS NOTES
Discharge instructions and prescriptions given and reviewed with pt, verbalizes understanding, pt asking for tylenol prior to discharge, will notify nurse.

## 2017-12-28 NOTE — PROGRESS NOTES
Patient alert and oriented. Resting in bed. Respirations even and unlabored. No distress at this time. Safety measures in place. Will continue to monitor.

## 2017-12-28 NOTE — PROGRESS NOTES
PRN Tylenol 650 mg given PO for Fever 101.2 Patient complaining of \"being hot\" and \"feverish\" Denies chills, dizziness and muscle aches. Respirations even and unlabored at 16. Will continue to monitor.

## 2017-12-28 NOTE — PROGRESS NOTES
Discharge order received,  Pt remains with 99.0 temp physician aware, no meds to be returned, no belongings to be returned. Jose Jones Discharge instructions given per discharge nurse.   Discharged via w/c to home/

## 2017-12-28 NOTE — DISCHARGE SUMMARY
Hospitalist Discharge Summary     Patient ID:  Casey Golden  [id number]  46 y.o.  1965  Admit date: 12/23/2017 10:08 PM  Discharge date and time: 12/28/2017  Attending: Will Perry MD  PCP:  Mellisa Vergara MD  Treatment Team: Attending Provider: Will Perry MD; Consulting Provider: Will Perry MD; Consulting Provider: Lavern Osorio NP; Utilization Review: Luis Fernando Duran RN    Principal Diagnosis Altered mental status   Hospital Problems as of 12/28/2017  Date Reviewed: 6/6/2017          Codes Class Noted - Resolved POA    Protein-calorie malnutrition, severe ([facility] ICD-10-CM: E43  ICD-9-CM: 262  12/25/2017 - Present Unknown        * (Principal)Altered mental status ICD-10-CM: R41.82  ICD-9-CM: 780.97  12/24/2017 - Present Unknown        Suicidal overdose ([facility] ICD-10-CM: F62.882L  ICD-9-CM: 977.9, E950.5  12/24/2017 - Present Unknown        Alcohol intoxication ([facility] ICD-10-CM: A46.166  ICD-9-CM: 305.00  12/24/2017 - Present Unknown              HPI: The patient is a 59-year-old  female who was not able to give any history. History was obtained by medical records and daughter when she was here, but she is gone, who presented with altered mental status. Daughter is saying that the patient has had some personal issues. She was upset. She was found to be drinking wine and found her lying on the ground and could not wake her up. She suspected that she took some of her medications and she found 2 bottles of empty Ambien around 8:30. Patient was unresponsive. She was intubated in the ED. Please look at the intubation note. She also had alcohol intoxication with alcohol level of around 200. By the time I saw the patient, the patient was intubated, sedated, not able to provide any history. Hospital Course: Admitted on 12/24 after being found unresponsive from an overdose (ambien and etoh). She was intubated and placed in the ICU.  Initially placed on commitment papers, but psych has since evaluated and believe this to be an unintentional overdose. As she is not currently endorsing SI or HI, they believe she no longer needs suicide precautions and is safe to be discharged home to follow up with outpatient psychiatry. Hospitalization complicated by [facility] UTI, for which she will complete a course of cipro at discharge. She was very concerned about weight loss, which seems to be multifactorial from poor po intake because of ill-fitting partial dentures, depression and ETOH abuse. I have started her on remeron and her PCP will follow up in a few weeks to increase the dose as needed. Significant Diagnostic Studies:   Labs: Results:       Chemistry Recent Labs      12/26/17   0713  12/25/17   1131   GLU  114*  117*   NA  143  143   K  3.7  3.2*   CL  110*  111*   CO2  23  26   BUN  4*  4*   CREA  0.40*  0.46*   CA  7.7*  7.9*   AGAP  10  6*   AP   --   76   TP   --   5.9*   ALB   --   2.8*   GLOB   --   3.1   AGRAT   --   0.9*      CBC w/Diff Recent Labs      12/28/17   0800  12/26/17   0713  12/25/17   1218   WBC  11.2*  10.0  8.6   RBC  4.10  3.29*  3.45*   HGB  13.5  10.6*  11.4*   HCT  39.1  31.7*  33.0*   PLT  305  232  228   GRANS  69  81*  78   LYMPH  16  11*  13   EOS  1  1  0*      Cardiac Enzymes No results for input(s): CPK, CKND1, KYLE in the last 72 hours. No lab exists for component: CKRMB, TROIP   Coagulation No results for input(s): PTP, INR, APTT in the last 72 hours. No lab exists for component: INREXT, INREXT    Lipid Panel Lab Results   Component Value Date/Time    Cholesterol, total 178 07/14/2015 11:12 AM    HDL Cholesterol 78 07/14/2015 11:12 AM    LDL, calculated 78 07/14/2015 11:12 AM    VLDL, calculated 22 07/14/2015 11:12 AM    Triglyceride 110 07/14/2015 11:12 AM      BNP No results for input(s): BNPP in the last 72 hours.    Liver Enzymes Recent Labs      12/25/17   1131   TP  5.9*   ALB  2.8*   AP  76   SGOT  12*      Thyroid Studies Lab Results   Component Value Date/Time    TSH 0.703 10/10/2017 04:44 PM            Discharge Exam:  Visit Vitals    /68 (BP 1 Location: Right arm, BP Patient Position: At rest)    Pulse 68    Temp 98.5 °F (36.9 °C)    Resp 17    Ht 5' 7.5\" (1.715 m)    Wt 44.4 kg (97 lb 14.4 oz)    SpO2 95%    BMI 15.11 kg/m2     General appearance: alert, cooperative, NAD  Lungs: clear to auscultation bilaterally  Heart: regular rate and rhythm  Abdomen: soft, NTTP  Neurologic: Grossly normal  Psych: Anxious    Disposition: home  Discharge Condition: stable  Patient Instructions:   Current Discharge Medication List      START taking these medications    Details   mirtazapine (REMERON) 15 mg tablet Take 1 Tab by mouth nightly. Qty: 30 Tab, Refills: 11      nicotine (NICODERM CQ) 14 mg/24 hr patch 1 Patch by TransDERmal route daily for 21 days. Qty: 21 Patch, Refills: 0      ciprofloxacin HCl (CIPRO) 500 mg tablet Take 1 Tab by mouth two (2) times a day for 7 days. Qty: 14 Tab, Refills: 0         CONTINUE these medications which have NOT CHANGED    Details   zolpidem (AMBIEN) 10 mg tablet Take 1/2 tablet at bedtime as needed  Qty: 15 Tab, Refills: 1    Associated Diagnoses: Insomnia, unspecified type      aspirin delayed-release 81 mg tablet Take  by mouth daily.              Activity: Activity as tolerated  Diet: Regular Diet    Follow-up  -   PCP in 1-2 weeks  -   Outpatient psych as she schedules    Signed:  Andry Dunbar MD  12/28/2017  10:05 AM

## 2017-12-28 NOTE — PROGRESS NOTES
Resting in bed, sleeping intervals,  Lung sounds clear, dry cough at intervals, , heart regular, abdomen flat, bs present appetite good, no edema noted, voids we/o any difficulty. hw left upper arm intact, site healthy. Lab resulted and reviewed. Temp 99.0 no pain at present time.   Aware to call for asst. Door open

## 2017-12-28 NOTE — PROGRESS NOTES
Report received from [facility]umar [facility] [address]. Patient alert and oriented, laying in bed watching tv. Will continue to monitor.

## 2017-12-29 LAB
BACTERIA SPEC CULT: NORMAL
BACTERIA SPEC CULT: NORMAL
SERVICE CMNT-IMP: NORMAL
SERVICE CMNT-IMP: NORMAL

## 2018-07-19 ENCOUNTER — HOSPITAL ENCOUNTER (OUTPATIENT)
Dept: MAMMOGRAPHY | Age: 53
Discharge: HOME | End: 2018-07-19
Attending: FAMILY MEDICINE
Payer: COMMERCIAL

## 2018-07-19 ENCOUNTER — HOSPITAL ENCOUNTER (OUTPATIENT)
Dept: CT IMAGING | Age: 53
Discharge: HOME | End: 2018-07-19
Attending: FAMILY MEDICINE
Payer: COMMERCIAL

## 2018-07-19 DIAGNOSIS — N63.10 MASS OF RIGHT BREAST: ICD-10-CM

## 2018-07-19 DIAGNOSIS — Z98.890 HX OF LEFT BREAST BIOPSY: ICD-10-CM

## 2018-07-19 DIAGNOSIS — R22.32 MASS OF LEFT UPPER EXTREMITY: ICD-10-CM

## 2018-07-19 PROCEDURE — 77066 DX MAMMO INCL CAD BI: CPT

## 2018-07-19 PROCEDURE — 76642 ULTRASOUND BREAST LIMITED: CPT

## 2018-07-19 PROCEDURE — 74011636320 HC RX REV CODE- 636/320

## 2018-07-19 PROCEDURE — 73201 CT UPPER EXTREMITY W/DYE: CPT

## 2018-07-19 PROCEDURE — 74011000258 HC RX REV CODE- 258

## 2018-07-19 RX ORDER — SODIUM CHLORIDE 0.9 % (FLUSH) 0.9 %
10 SYRINGE (ML) INJECTION
Status: COMPLETED | OUTPATIENT
Start: 2018-07-19 | End: 2018-07-19

## 2018-07-19 RX ADMIN — SODIUM CHLORIDE 100 ML: 900 INJECTION, SOLUTION INTRAVENOUS at 09:53

## 2018-07-19 RX ADMIN — IOPAMIDOL 100 ML: 755 INJECTION, SOLUTION INTRAVENOUS at 09:52

## 2018-07-19 RX ADMIN — Medication 10 ML: at 09:53

## 2018-07-20 NOTE — PROGRESS NOTES
The area of concern appears to be cysts. You have extremely dense breasts  This increases your risk of breast cancer  Recommend ultrasound of both breasts.   Will schedule if agreeable  Carol Ann Barney MD

## 2018-07-20 NOTE — PROGRESS NOTES
Lesion of arm noted on CT of left arm,  MRI recommended for further evaluation  Will schedule if agreeable  Emani Pichardo MD

## 2018-08-24 ENCOUNTER — HOSPITAL ENCOUNTER (OUTPATIENT)
Dept: MRI IMAGING | Age: 53
Discharge: HOME | End: 2018-08-24
Attending: FAMILY MEDICINE
Payer: COMMERCIAL

## 2018-08-24 ENCOUNTER — HOSPITAL ENCOUNTER (OUTPATIENT)
Dept: MAMMOGRAPHY | Age: 53
Discharge: HOME | End: 2018-08-24
Attending: FAMILY MEDICINE
Payer: COMMERCIAL

## 2018-08-24 DIAGNOSIS — R92.2 DENSE BREASTS: ICD-10-CM

## 2018-08-24 DIAGNOSIS — Z91.89 INCREASED RISK OF BREAST CANCER: ICD-10-CM

## 2018-08-24 DIAGNOSIS — R22.32 MASS OF LEFT UPPER EXTREMITY: ICD-10-CM

## 2018-08-24 PROCEDURE — 74011250636 HC RX REV CODE- 250/636

## 2018-08-24 PROCEDURE — A9575 INJ GADOTERATE MEGLUMI 0.1ML: HCPCS

## 2018-08-24 PROCEDURE — 76641 ULTRASOUND BREAST COMPLETE: CPT

## 2018-08-24 PROCEDURE — 73220 MRI UPPR EXTREMITY W/O&W/DYE: CPT

## 2018-08-24 RX ORDER — GADOTERATE MEGLUMINE 376.9 MG/ML
9 INJECTION INTRAVENOUS
Status: COMPLETED | OUTPATIENT
Start: 2018-08-24 | End: 2018-08-24

## 2018-08-24 RX ORDER — SODIUM CHLORIDE 0.9 % (FLUSH) 0.9 %
10 SYRINGE (ML) INJECTION
Status: COMPLETED | OUTPATIENT
Start: 2018-08-24 | End: 2018-08-24

## 2018-08-24 RX ADMIN — Medication 10 ML: at 12:35

## 2018-08-24 RX ADMIN — GADOTERATE MEGLUMINE 9 ML: 376.9 INJECTION INTRAVENOUS at 12:35

## 2018-09-10 NOTE — PROGRESS NOTES
Ultrasound of breasts normal.  Lesion left arm seen on MRI  Ultrasound recommended. Would like to order ultrasound of lesion left upper arm.   Would like to go ahead and make referral to surgery also  Kim Myers MD

## 2018-09-17 ENCOUNTER — HOSPITAL ENCOUNTER (OUTPATIENT)
Dept: ULTRASOUND IMAGING | Age: 53
Discharge: HOME | End: 2018-09-17
Attending: FAMILY MEDICINE
Payer: COMMERCIAL

## 2018-09-17 DIAGNOSIS — R22.32 MASS OF ARM, LEFT: ICD-10-CM

## 2018-09-17 PROCEDURE — 76882 US LMTD JT/FCL EVL NVASC XTR: CPT

## 2018-09-19 NOTE — PROGRESS NOTES
Patient needs referral to surgery for evaluation of mass left arm.  Mass seen on ultrasound also  Please place referral if agreeable  Juana Ward MD

## 2019-01-30 PROBLEM — T50.902A SUICIDAL OVERDOSE (HCC): Status: RESOLVED | Noted: 2017-12-24 | Resolved: 2019-01-30

## 2019-01-30 PROBLEM — F10.929 ALCOHOL INTOXICATION (HCC): Status: RESOLVED | Noted: 2017-12-24 | Resolved: 2019-01-30

## 2019-01-30 PROBLEM — E43 PROTEIN-CALORIE MALNUTRITION, SEVERE (HCC): Status: RESOLVED | Noted: 2017-12-25 | Resolved: 2019-01-30

## 2019-01-30 PROBLEM — R41.82 ALTERED MENTAL STATUS: Status: RESOLVED | Noted: 2017-12-24 | Resolved: 2019-01-30

## 2019-02-27 NOTE — H&P
Manoj Burkett MD  
Physician General Surgery Progress Notes     
Signed Encounter Date:  19 []Hide copied text []Hover for details 
 
 
aDate: 19 
  
  
Name: Jamie Ly MRN: [id number] : 1965 Age: 48 y.o. Sex: female Pavan Egan MD  
  
  
CC:   
    
Chief Complaint Patient presents with  New Patient  
    mass on left arm  
  
  
HPI: 
  
 Francy Figueroatifitz is a 48 y.o. female who presents for evaluation of a soft tissue mass as a referral from Dr. Margaux Winslow. The patient has had a left arm mass for Rudy Meigs couple of years. \" The lesion has changed in shape. No pain, but she \"knows it is there. \" No reported trauma to the area. No drainage. No signs of infection. An MRi was done on 18 which showed: 
Exam: MRI left biceps with and without contrast. 
Indication: Soft tissue mass of the left upper extremity. COMPARISON: CT left humerus dated 2018 
  
Findings: There is a mildly enhancing subcutaneous soft tissue mass along the anterior and 
inferior aspect of the upper arm superficial to the biceps muscle measuring 
approximately 3 x 4 x 0.5 cm without evidence of aggressive features. No 
evidence of muscular involvement. No evidence of osseous lesion. No muscle tear 
or tendon tear seen. No significant elbow joint effusion. 
  
IMPRESSION IMPRESSION: 
Indeterminate nonaggressive appearing subcutaneous mildly enhancing lesion of 
the anterior and lower aspect of the upper arm measuring approximately 3 x 4 x 
0.5 cm. Likely differential diagnosis to include however not limited to 
granuloma annulare, soft tissue vascular lesion just hemangioma or venous 
malformation, and soft tissue myxoma. Malignant lesion is not fully excluded but 
felt unlikely. A targeted ultrasound for further evaluation to assess vascular 
flow.  Recommend imaging follow-up to assess stability. 
  
 An ultrasound was done on 9/10/18 which showed no flow, so it is not vascular. 
  
PMH: 
  
History reviewed. No pertinent past medical history. 
  
PSH: 
  
     
Past Surgical History:  
Procedure Laterality Date  ENDOSCOPY, COLON, DIAGNOSTIC     
  normal (Dr. Luke Renteria)  HX BREAST BIOPSY      
  fibrocystic '05  
 HX GYN   2005  
  hysterectomy ,left ovaries  HX HEENT      
  tonsilectomy  HARJINDER BIOPSY BREAST STEREOTACTIC Left 2016  
  
  
MEDS: 
  
Current Outpatient Medications Medication Sig  ibuprofen (MOTRIN) 200 mg tablet Take  by mouth as needed for Pain.  aspirin delayed-release 81 mg tablet Take  by mouth daily.  
  
No current facility-administered medications for this visit.   
  
  
ALLERGIES:   
  
     
Allergies Allergen Reactions  Effexor [Venlafaxine] Other (comments)  
    Too much\" could not fcn  Toradol [Ketorolac Tromethamine] Itching  
  
  
SH: 
  
Social History  
  
     
Tobacco Use  Smoking status: Former Smoker  
    Types: Cigarettes  
    Last attempt to quit: 2017  
    Years since quittin.7  Smokeless tobacco: Never Used  Tobacco comment: now vapes Substance Use Topics  Alcohol use: No  
    Alcohol/week: 0.0 oz  Drug use: No  
  
  
FH: 
  
     
Family History Problem Relation Age of Onset [place] Other Mother    
      macular degeneration  Cancer Maternal Aunt    
      breast 45s  Breast Cancer Maternal Aunt 52  
 Heart Disease Maternal Grandmother    
      CHF MI at age 61  Cancer Sister    
      vaginal cancer/squamous  Cancer Paternal Grandmother    
  
  
ROS: The patient has no difficulty with chest pain or shortness of breath. No fever or chills. Comprehensive 13 point review of systems was otherwise unremarkable except as noted above. 
  
Physical Exam:  
  
Visit Vitals /76 Pulse 68 Ht 5' 6.5\" (1.689 m) Wt 99 lb (44.9 kg) BMI 15.74 kg/m²  
  
  
 General: Alert, oriented, thin white female in no acute distress. Eyes: Sclera are clear. Conjunctiva and lids within normal limits. No icterus. Ears and Nose: no gross deformities to visual inspection, gross hearing intact Neck: Supple, trachea midline. No lymphadenopathy. Resp: Breathing is  non-labored. Lungs clear to auscultation without wheezing or rhonchi CV: RRR. No murmurs, rubs or gallops appreciated. Abd: soft non-tender and non-distended without peritoneal signs. +bs Extr: 3 cm x 4 cm soft tissue mass left bicep. No tenderness. No skin involvement. Psych:  Mood and affect appropriate. Short-term memory and understanding intact 
  
  
Assessment/Plan:  Homer Rodrigez is a 48 y.o. female who has signs and symptoms consistent with a soft tissue mass left upper arm. 
  
1. Excision of a left upper arm soft tissue mass in the operating room. 
  
2.  I went through the risks of bleeding, infection, anesthesia, hematoma/seroma formation and possible recurrence of the lesion. I said it may be necessary to place a drain. It may be necessary to leave the wound open, if badly infected. 
  
Kimi Varghese MD 
             
 [facility]   2/27/19   
  
 
  
  
Electronically signed by Nan Rowe MD at 2/27/19 Note Details Rai Chong MD File Time 2/27/19 Author Type Physician Status Signed Last  Nan Rowe MD Specialty General Surgery 2/27/19 Revision History Detailed Report Note shared with patient

## 2019-02-28 ENCOUNTER — ANESTHESIA EVENT (OUTPATIENT)
Dept: SURGERY | Age: 54
End: 2019-02-28
Payer: COMMERCIAL

## 2019-03-01 ENCOUNTER — ANESTHESIA (OUTPATIENT)
Dept: SURGERY | Age: 54
End: 2019-03-01
Payer: COMMERCIAL

## 2019-03-01 ENCOUNTER — HOSPITAL ENCOUNTER (OUTPATIENT)
Age: 54
Setting detail: OUTPATIENT SURGERY
Discharge: HOME | End: 2019-03-01
Attending: SURGERY | Admitting: SURGERY
Payer: COMMERCIAL

## 2019-03-01 VITALS
BODY MASS INDEX: 16.18 KG/M2 | HEART RATE: 60 BPM | WEIGHT: 103.06 LBS | HEIGHT: 67 IN | RESPIRATION RATE: 16 BRPM | OXYGEN SATURATION: 95 % | SYSTOLIC BLOOD PRESSURE: 143 MMHG | DIASTOLIC BLOOD PRESSURE: 73 MMHG | TEMPERATURE: 98.5 F

## 2019-03-01 PROCEDURE — 74011250636 HC RX REV CODE- 250/636: Performed by: ANESTHESIOLOGY

## 2019-03-01 PROCEDURE — 76010000154 HC OR TIME FIRST 0.5 HR: Performed by: SURGERY

## 2019-03-01 PROCEDURE — 76210000020 HC REC RM PH II FIRST 0.5 HR: Performed by: SURGERY

## 2019-03-01 PROCEDURE — 74011250636 HC RX REV CODE- 250/636: Performed by: SURGERY

## 2019-03-01 PROCEDURE — 74011000250 HC RX REV CODE- 250: Performed by: SURGERY

## 2019-03-01 PROCEDURE — 74011250636 HC RX REV CODE- 250/636

## 2019-03-01 PROCEDURE — 77030010509 HC AIRWY LMA MSK TELE -A: Performed by: NURSE ANESTHETIST, CERTIFIED REGISTERED

## 2019-03-01 PROCEDURE — 74011000250 HC RX REV CODE- 250

## 2019-03-01 PROCEDURE — 76060000032 HC ANESTHESIA 0.5 TO 1 HR: Performed by: SURGERY

## 2019-03-01 PROCEDURE — 88305 TISSUE EXAM BY PATHOLOGIST: CPT

## 2019-03-01 PROCEDURE — 77030018836 HC SOL IRR NACL ICUM -A: Performed by: SURGERY

## 2019-03-01 PROCEDURE — 77030020782 HC GWN BAIR PAWS FLX 3M -B: Performed by: NURSE ANESTHETIST, CERTIFIED REGISTERED

## 2019-03-01 PROCEDURE — 76210000063 HC OR PH I REC FIRST 0.5 HR: Performed by: SURGERY

## 2019-03-01 PROCEDURE — 74011250637 HC RX REV CODE- 250/637: Performed by: ANESTHESIOLOGY

## 2019-03-01 RX ORDER — ONDANSETRON 2 MG/ML
INJECTION INTRAMUSCULAR; INTRAVENOUS AS NEEDED
Status: DISCONTINUED | OUTPATIENT
Start: 2019-03-01 | End: 2019-03-01 | Stop reason: HOSPADM

## 2019-03-01 RX ORDER — MIDAZOLAM HYDROCHLORIDE 1 MG/ML
2 INJECTION, SOLUTION INTRAMUSCULAR; INTRAVENOUS
Status: COMPLETED | OUTPATIENT
Start: 2019-03-01 | End: 2019-03-01

## 2019-03-01 RX ORDER — BUPIVACAINE HYDROCHLORIDE 5 MG/ML
INJECTION, SOLUTION EPIDURAL; INTRACAUDAL AS NEEDED
Status: DISCONTINUED | OUTPATIENT
Start: 2019-03-01 | End: 2019-03-01 | Stop reason: HOSPADM

## 2019-03-01 RX ORDER — CEFAZOLIN SODIUM/WATER 2 G/20 ML
2 SYRINGE (ML) INTRAVENOUS
Status: COMPLETED | OUTPATIENT
Start: 2019-03-01 | End: 2019-03-01

## 2019-03-01 RX ORDER — MIDAZOLAM HYDROCHLORIDE 1 MG/ML
2 INJECTION, SOLUTION INTRAMUSCULAR; INTRAVENOUS ONCE
Status: DISCONTINUED | OUTPATIENT
Start: 2019-03-01 | End: 2019-03-01 | Stop reason: HOSPADM

## 2019-03-01 RX ORDER — HYDROMORPHONE HYDROCHLORIDE 2 MG/ML
0.5 INJECTION, SOLUTION INTRAMUSCULAR; INTRAVENOUS; SUBCUTANEOUS
Status: DISCONTINUED | OUTPATIENT
Start: 2019-03-01 | End: 2019-03-01 | Stop reason: HOSPADM

## 2019-03-01 RX ORDER — OXYCODONE HYDROCHLORIDE 5 MG/1
5 TABLET ORAL
Status: COMPLETED | OUTPATIENT
Start: 2019-03-01 | End: 2019-03-01

## 2019-03-01 RX ORDER — DEXAMETHASONE SODIUM PHOSPHATE 4 MG/ML
INJECTION, SOLUTION INTRA-ARTICULAR; INTRALESIONAL; INTRAMUSCULAR; INTRAVENOUS; SOFT TISSUE AS NEEDED
Status: DISCONTINUED | OUTPATIENT
Start: 2019-03-01 | End: 2019-03-01 | Stop reason: HOSPADM

## 2019-03-01 RX ORDER — FENTANYL CITRATE 50 UG/ML
INJECTION, SOLUTION INTRAMUSCULAR; INTRAVENOUS AS NEEDED
Status: DISCONTINUED | OUTPATIENT
Start: 2019-03-01 | End: 2019-03-01 | Stop reason: HOSPADM

## 2019-03-01 RX ORDER — SODIUM CHLORIDE, SODIUM LACTATE, POTASSIUM CHLORIDE, CALCIUM CHLORIDE 600; 310; 30; 20 MG/100ML; MG/100ML; MG/100ML; MG/100ML
50 INJECTION, SOLUTION INTRAVENOUS CONTINUOUS
Status: DISCONTINUED | OUTPATIENT
Start: 2019-03-01 | End: 2019-03-01 | Stop reason: HOSPADM

## 2019-03-01 RX ORDER — SODIUM CHLORIDE, SODIUM LACTATE, POTASSIUM CHLORIDE, CALCIUM CHLORIDE 600; 310; 30; 20 MG/100ML; MG/100ML; MG/100ML; MG/100ML
75 INJECTION, SOLUTION INTRAVENOUS CONTINUOUS
Status: DISCONTINUED | OUTPATIENT
Start: 2019-03-01 | End: 2019-03-01 | Stop reason: HOSPADM

## 2019-03-01 RX ORDER — FENTANYL CITRATE 50 UG/ML
100 INJECTION, SOLUTION INTRAMUSCULAR; INTRAVENOUS ONCE
Status: DISCONTINUED | OUTPATIENT
Start: 2019-03-01 | End: 2019-03-01 | Stop reason: HOSPADM

## 2019-03-01 RX ORDER — ALBUTEROL SULFATE 0.83 MG/ML
2.5 SOLUTION RESPIRATORY (INHALATION) AS NEEDED
Status: DISCONTINUED | OUTPATIENT
Start: 2019-03-01 | End: 2019-03-01 | Stop reason: HOSPADM

## 2019-03-01 RX ORDER — LIDOCAINE HYDROCHLORIDE 10 MG/ML
0.1 INJECTION INFILTRATION; PERINEURAL AS NEEDED
Status: DISCONTINUED | OUTPATIENT
Start: 2019-03-01 | End: 2019-03-01 | Stop reason: HOSPADM

## 2019-03-01 RX ORDER — LIDOCAINE HYDROCHLORIDE 20 MG/ML
INJECTION, SOLUTION EPIDURAL; INFILTRATION; INTRACAUDAL; PERINEURAL AS NEEDED
Status: DISCONTINUED | OUTPATIENT
Start: 2019-03-01 | End: 2019-03-01 | Stop reason: HOSPADM

## 2019-03-01 RX ORDER — PROPOFOL 10 MG/ML
INJECTION, EMULSION INTRAVENOUS AS NEEDED
Status: DISCONTINUED | OUTPATIENT
Start: 2019-03-01 | End: 2019-03-01 | Stop reason: HOSPADM

## 2019-03-01 RX ADMIN — ONDANSETRON 4 MG: 2 INJECTION INTRAMUSCULAR; INTRAVENOUS at 11:50

## 2019-03-01 RX ADMIN — PROPOFOL 180 MG: 10 INJECTION, EMULSION INTRAVENOUS at 11:39

## 2019-03-01 RX ADMIN — SODIUM CHLORIDE, SODIUM LACTATE, POTASSIUM CHLORIDE, AND CALCIUM CHLORIDE 75 ML/HR: 600; 310; 30; 20 INJECTION, SOLUTION INTRAVENOUS at 10:31

## 2019-03-01 RX ADMIN — FENTANYL CITRATE 25 MCG: 50 INJECTION, SOLUTION INTRAMUSCULAR; INTRAVENOUS at 11:47

## 2019-03-01 RX ADMIN — Medication 2 G: at 11:44

## 2019-03-01 RX ADMIN — MIDAZOLAM HYDROCHLORIDE 2 MG: 2 INJECTION, SOLUTION INTRAMUSCULAR; INTRAVENOUS at 10:53

## 2019-03-01 RX ADMIN — DEXAMETHASONE SODIUM PHOSPHATE 4 MG: 4 INJECTION, SOLUTION INTRA-ARTICULAR; INTRALESIONAL; INTRAMUSCULAR; INTRAVENOUS; SOFT TISSUE at 11:50

## 2019-03-01 RX ADMIN — LIDOCAINE HYDROCHLORIDE 80 MG: 20 INJECTION, SOLUTION EPIDURAL; INFILTRATION; INTRACAUDAL; PERINEURAL at 11:39

## 2019-03-01 RX ADMIN — FENTANYL CITRATE 25 MCG: 50 INJECTION, SOLUTION INTRAMUSCULAR; INTRAVENOUS at 11:51

## 2019-03-01 RX ADMIN — OXYCODONE HYDROCHLORIDE 5 MG: 5 TABLET ORAL at 12:35

## 2019-03-01 NOTE — INTERVAL H&P NOTE
H&P Update: 
Francy Malik was seen and examined. History and physical has been reviewed. The patient has been examined.  There have been no significant clinical changes since the completion of the originally dated History and Physical. 
 
Signed By: Petros Grande MD   
 March 1, 2019 10:28 AM

## 2019-03-01 NOTE — BRIEF OP NOTE
BRIEF OPERATIVE NOTE Date of Procedure: 3/1/2019 Preoperative Diagnosis: Mass of upper extremity, left [R22.32] Postoperative Diagnosis: Same Procedure(s): MASS EXCISION LEFT UPPER EXTREMITY MEASURING 2 CM X 2 CM X 1 CM Surgeon(s) and Role: 
   Mabel Alonzo MD - Primary Surgical Assistant: None Surgical Staff: 
Circ-1: Milton Acevedo RN Scrub Tech-1: Satnam Cleveland Scrub Tech-3: Travis Umana Event Time In Time Out Incision Start 1147 Incision Close Anesthesia: General plus local 
Estimated Blood Loss: 2 cc's Specimens:  
ID Type Source Tests Collected by Time Destination 1 : Left arm soft tissue mass Preservative Arm  Rashid Lord MD 3/1/2019 1149 Pathology Findings: Likely lipoma Complications: None Implants: * No implants in log *

## 2019-03-01 NOTE — DISCHARGE INSTRUCTIONS
1. Diet as tolerated except for a  low fat diet after laparoscopic cholecystectomy. 2. Showering is allowed, but no tub baths, hot tubs or swimming. 3. Drainage is common from the wounds. Change the dressings as needed. Call our office if the wounds become reddened, tender, feel warm to the touch or pus starts to drain from the wound. 4. Take prescribed pain medication as directed, usually Percocet, Norco, Ultram or Dilaudid. Take over the counter medication for minor pain. 5. Ice may be applied intermittently to the surgical site or sites. 6. Call our office, [phone], if problems arise. 7. Follow up in the office at the assigned time. 8. Resume all medications as taken per surgery, unless specifically instructed not to take certain ones. 9. No lifting more than 25 pounds until told otherwise. 10. Driving is allowed 3 days after surgery as long as you feel comfortable enough to drive and have not taken any prescription pain medication prior to driving. After general anesthesia or intravenous sedation, for 24 hours or while taking prescription Narcotics:  · Limit your activities  · Do not drive and operate hazardous machinery  · Do not make important personal or business decisions  · Do  not drink alcoholic beverages  · If you have not urinated within 8 hours after discharge, please contact your surgeon on call. *  Please give a list of your current medications to your Primary Care Provider. *  Please update this list whenever your medications are discontinued, doses are      changed, or new medications (including over-the-counter products) are added. *  Please carry medication information at all times in case of emergency situations. These are general instructions for a healthy lifestyle:  No smoking/ No tobacco products/ Avoid exposure to second hand smoke  Surgeon General's Warning:  Quitting smoking now greatly reduces serious risk to your health.   Obesity, smoking, and sedentary lifestyle greatly increases your risk for illness  A healthy diet, regular physical exercise & weight monitoring are important for maintaining a healthy lifestyle    You may be retaining fluid if you have a history of heart failure or if you experience any of the following symptoms:  Weight gain of 3 pounds or more overnight or 5 pounds in a week, increased swelling in our hands or feet or shortness of breath while lying flat in bed. Please call your doctor as soon as you notice any of these symptoms; do not wait until your next office visit. Recognize signs and symptoms of STROKE:  F-face looks uneven  A-arms unable to move or move unevenly  S-speech slurred or non-existent  T-time-call 911 as soon as signs and symptoms begin-DO NOT go       Back to bed or wait to see if you get better-TIME IS BRAIN.

## 2019-03-01 NOTE — ANESTHESIA POSTPROCEDURE EVALUATION
Procedure(s): MASS EXCISION LEFT UPPER EXTREMITY. Anesthesia Post Evaluation Multimodal analgesia: multimodal analgesia used between 6 hours prior to anesthesia start to PACU discharge Patient location during evaluation: bedside Patient participation: complete - patient participated Level of consciousness: awake and alert Pain score: 0 Pain management: adequate Airway patency: patent Anesthetic complications: no 
Cardiovascular status: acceptable Respiratory status: acceptable Hydration status: acceptable Comments: Pt doing well. Ok to d/c home. Post anesthesia nausea and vomiting:  none Visit Vitals /60 Pulse (!) 59 Temp 36.9 °C (98.5 °F) Resp 16 Ht 5' 7\" (1.702 m) Wt 46.7 kg (103 lb 1 oz) SpO2 100% BMI 16.14 kg/m²

## 2019-03-01 NOTE — ANESTHESIA PREPROCEDURE EVALUATION
Anesthetic History No history of anesthetic complications Review of Systems / Medical History Patient summary reviewed and pertinent labs reviewed Pulmonary Smoker (1ppd currently) Neuro/Psych Within defined limits Cardiovascular Exercise tolerance: >4 METS Comments: Denies CP, SOB or changes in functional status GI/Hepatic/Renal 
Within defined limits Endo/Other Within defined limits Other Findings Comments: Alcohol use in past  
 
 
 
 
Physical Exam 
 
Airway Mallampati: II 
TM Distance: 4 - 6 cm Neck ROM: normal range of motion Mouth opening: Normal 
 
 Cardiovascular Rhythm: regular Rate: normal 
 
 
 
 Dental 
No notable dental hx Pulmonary Breath sounds clear to auscultation Abdominal 
GI exam deferred Other Findings Anesthetic Plan ASA: 2 Anesthesia type: general 
 
 
 
 
Induction: Intravenous Anesthetic plan and risks discussed with: Patient and Family Plan for LMA

## 2019-03-01 NOTE — OP NOTES
[facility]  OPERATIVE REPORT    Name:  Mary Jo Cisneros  MR#:  [id number]  :  1965  ACCOUNT #:  [de-identified]  DATE OF SERVICE:  2019    PREOPERATIVE DIAGNOSIS:  Left upper arm soft tissue mass. POSTOPERATIVE DIAGNOSIS:  Left upper arm soft tissue mass, likely lipoma. PROCEDURE PERFORMED:  Excision of a left upper arm soft tissue mass measuring 2 cm x 2 cm x 1 cm. SURGEON:  Page Code. [facility] MD Jessica    ASSISTANT:  None. ANESTHESIA:  General anesthesia via an LMA plus 25 mL 0.5% Marcaine plain used as a local anesthesia at the end of the procedure. COMPLICATIONS:  None. SPECIMENS REMOVED:  Left upper arm soft tissue mass. DRAINS:  None. IMPLANTS:  None. ESTIMATED BLOOD LOSS:  2 mL. HISTORY:  A 45-year-old female referred by Dr. Kateryna Scott for excision of a soft tissue mass in the left upper arm. She says the area is painful and increasing in size. She asked to have it excised. I recommended excision in the operating room. I went through risks of bleeding, infection, anesthesia, hematoma, seroma formation, potential for recurrence of this lesion. Patient was agreeable, signed the consent form. PROCEDURE:  Patient was seen in the preop area where the area was marked with her consent. She was then transported to room #2 at [facility] Operating Room. The patient placed on the operating table in supine position. General anesthesia via an LMA was administered. The left upper arm was prepped and draped in usual sterile manner. I did a time-out identifying patient, surgical procedure and her birth date of 1965. When everyone in the room agreed, we began the procedure. Made an incision overlying this area incising skin and soft tissue with a 15 scalpel blade. Soft tissue mass, likely lipoma was excised and sent to pathology for evaluation. It measured 2 cm x 2 cm x 1 cm. The wound was irrigated.   Hemostasis achieved with electrocautery. It went down to the fascia, but did not include the fascia, was simply soft tissue mass. I closed the skin with two vertical mattress sutures of 3-0 nylon and some staples. I injected 25 mL of 0.5% Marcaine around this wound as local anesthesia. Patient had dry sterile dressing applied. She had the LMA removed and was brought to recovery room in stable condition. She would be able to go home later today. Follow up with me in 2 weeks.       MD JASON [email or web address]  D:  03/01/2019 11:56  T:  03/01/2019 14:32  JOB #:  [id number]

## 2019-07-29 ENCOUNTER — HOSPITAL ENCOUNTER (OUTPATIENT)
Dept: MAMMOGRAPHY | Age: 54
Discharge: HOME | End: 2019-07-29
Attending: FAMILY MEDICINE
Payer: COMMERCIAL

## 2019-07-29 DIAGNOSIS — Z12.31 SCREENING MAMMOGRAM, ENCOUNTER FOR: ICD-10-CM

## 2019-07-29 PROCEDURE — 77067 SCR MAMMO BI INCL CAD: CPT

## 2019-08-05 NOTE — PROGRESS NOTES
Your mammogram is normal.  You have very dense breasts. This increases your risk of breast cancer. Recommend bilateral breast ultrasound  Will schedule if agreeable.   Yanira Whitley MD

## 2020-11-19 PROBLEM — N76.0 BACTERIAL VAGINOSIS: Status: ACTIVE | Noted: 2020-11-19

## 2020-11-19 PROBLEM — B96.89 BACTERIAL VAGINOSIS: Status: ACTIVE | Noted: 2020-11-19

## 2020-11-19 PROBLEM — N89.8 VAGINAL DISCHARGE: Status: ACTIVE | Noted: 2020-11-19

## 2020-11-19 PROBLEM — R79.89 LOW VITAMIN D LEVEL: Status: ACTIVE | Noted: 2020-11-19

## 2020-11-19 PROBLEM — Z23 ENCOUNTER FOR IMMUNIZATION: Status: ACTIVE | Noted: 2020-11-19

## 2020-11-19 PROBLEM — M89.8X9 BONE PAIN: Status: ACTIVE | Noted: 2020-11-19

## 2020-12-19 PROBLEM — Z23 ENCOUNTER FOR IMMUNIZATION: Status: RESOLVED | Noted: 2020-11-19 | Resolved: 2020-12-19

## 2021-04-29 ENCOUNTER — HOSPITAL ENCOUNTER (OUTPATIENT)
Dept: MAMMOGRAPHY | Age: 56
Discharge: HOME | End: 2021-04-29
Attending: FAMILY MEDICINE
Payer: COMMERCIAL

## 2021-04-29 DIAGNOSIS — M85.80 OSTEOPENIA, UNSPECIFIED LOCATION: ICD-10-CM

## 2021-04-29 PROCEDURE — 77080 DXA BONE DENSITY AXIAL: CPT

## 2021-04-30 ENCOUNTER — HOSPITAL ENCOUNTER (OUTPATIENT)
Dept: CT IMAGING | Age: 56
Discharge: HOME | End: 2021-04-30
Attending: INTERNAL MEDICINE
Payer: COMMERCIAL

## 2021-04-30 VITALS — SYSTOLIC BLOOD PRESSURE: 100 MMHG | HEART RATE: 70 BPM | DIASTOLIC BLOOD PRESSURE: 60 MMHG | RESPIRATION RATE: 18 BRPM

## 2021-04-30 DIAGNOSIS — R07.2 SUBSTERNAL PRECORDIAL CHEST PAIN: ICD-10-CM

## 2021-04-30 PROCEDURE — 74011000258 HC RX REV CODE- 258: Performed by: INTERNAL MEDICINE

## 2021-04-30 PROCEDURE — 74011000636 HC RX REV CODE- 636: Performed by: INTERNAL MEDICINE

## 2021-04-30 PROCEDURE — 75574 CT ANGIO HRT W/3D IMAGE: CPT

## 2021-04-30 PROCEDURE — 74011250637 HC RX REV CODE- 250/637: Performed by: INTERNAL MEDICINE

## 2021-04-30 RX ORDER — NITROGLYCERIN 0.4 MG/1
0.4 TABLET SUBLINGUAL ONCE
Status: COMPLETED | OUTPATIENT
Start: 2021-04-30 | End: 2021-04-30

## 2021-04-30 RX ORDER — SODIUM CHLORIDE 0.9 % (FLUSH) 0.9 %
10 SYRINGE (ML) INJECTION
Status: COMPLETED | OUTPATIENT
Start: 2021-04-30 | End: 2021-04-30

## 2021-04-30 RX ADMIN — Medication 10 ML: at 08:19

## 2021-04-30 RX ADMIN — IOPAMIDOL 100 ML: 755 INJECTION, SOLUTION INTRAVENOUS at 08:19

## 2021-04-30 RX ADMIN — NITROGLYCERIN 0.4 MG: 0.4 TABLET SUBLINGUAL at 08:26

## 2021-04-30 RX ADMIN — SODIUM CHLORIDE 100 ML: 900 INJECTION, SOLUTION INTRAVENOUS at 08:19

## 2021-05-03 PROCEDURE — 75574 CT ANGIO HRT W/3D IMAGE: CPT | Performed by: INTERNAL MEDICINE

## 2021-05-04 NOTE — PROGRESS NOTES
Please call the patient regarding their normal result. Heart arteries look normal on CT scan. No evidence of blockages.

## 2021-11-22 PROBLEM — Z00.00 WELL WOMAN EXAM (NO GYNECOLOGICAL EXAM): Status: ACTIVE | Noted: 2021-11-22

## 2021-12-01 ENCOUNTER — HOSPITAL ENCOUNTER (OUTPATIENT)
Dept: CT IMAGING | Age: 56
Discharge: HOME | End: 2021-12-01
Attending: FAMILY MEDICINE
Payer: COMMERCIAL

## 2021-12-01 VITALS — WEIGHT: 120 LBS | BODY MASS INDEX: 18.83 KG/M2 | HEIGHT: 67 IN

## 2021-12-01 DIAGNOSIS — F17.210 SMOKING GREATER THAN 30 PACK YEARS: ICD-10-CM

## 2021-12-01 PROCEDURE — 71271 CT THORAX LUNG CANCER SCR C-: CPT

## 2021-12-02 NOTE — PROGRESS NOTES
Cancer screen shows very small pulmonary nodule in the left side of lung. Not concerning. These are usually not cancerous. It is recommended to recheck in 12 months.

## 2021-12-02 NOTE — PROGRESS NOTES
Pt notified of results and was not happy with result repot that was given. Pt has many more questions regarding exact things in report on [employer] [employer].  I explained the results not and explained that I could pass message on to Manisha Pond

## 2021-12-22 PROBLEM — Z00.00 WELL WOMAN EXAM (NO GYNECOLOGICAL EXAM): Status: RESOLVED | Noted: 2021-11-22 | Resolved: 2021-12-22

## 2021-12-29 ENCOUNTER — HOSPITAL ENCOUNTER (OUTPATIENT)
Dept: LAB | Age: 56
Discharge: HOME | End: 2021-12-29

## 2021-12-29 PROCEDURE — 88305 TISSUE EXAM BY PATHOLOGIST: CPT

## 2022-06-22 ENCOUNTER — NURSE TRIAGE (OUTPATIENT)
Dept: OTHER | Facility: CLINIC | Age: 57
End: 2022-06-22

## 2022-06-22 NOTE — TELEPHONE ENCOUNTER
Received call from [facility] with Red Flag Complaint. Subjective: Caller states \"abdominal pain, diarrhea\"     Current Symptoms: diarrhea  X 3 and 2  emesis. Upper and lower abdominal pain. Unable to describe where pain is stating it's \"in my abdomen\"   She is currently at work. States not sleeping much. Unable to keep anything down. She refuses to go to ER for her severe abdominal pain. Explained the reasoning, may need testing. She also has ear congestion- can't hear well, has to hold nose to clear ears. Onset: 2 days ago; sudden, intermittent    Pain Severity: 8/10; aching; intermittent? Or constant?- both answers given several times. Asked patient multiple times and she keeps with her severe pain status. Temperature: denies by unknown method    What has been tried: unsure    LMP: NA Pregnant: NA    Recommended disposition: Go to ED Now    Care advice provided, patient verbalizes understanding; denies any other questions or concerns; instructed to call back for any new or worsening symptoms. Writer provided warm transfer to [facility] for refusal of urgent disposition     Attention Provider: Thank you for allowing me to participate in the care of your patient. The patient was connected to triage in response to information provided to the ECC/PSC. Please do not respond through this encounter as the response is not directed to a shared pool.          Reason for Disposition   SEVERE abdominal pain (e.g., excruciating)    Protocols used: ABDOMINAL PAIN - [facility] - ROSEMARY GILLIS

## 2022-07-31 ENCOUNTER — TELEPHONE (OUTPATIENT)
Dept: FAMILY MEDICINE CLINIC | Facility: CLINIC | Age: 57
End: 2022-07-31

## 2022-08-01 RX ORDER — ALBUTEROL SULFATE 90 UG/1
AEROSOL, METERED RESPIRATORY (INHALATION)
Qty: 20.1 G | Refills: 5 | Status: SHIPPED | OUTPATIENT
Start: 2022-08-01

## 2022-08-01 NOTE — TELEPHONE ENCOUNTER
Received a message from [phone]. Contacted patient with her being COVID-positive. Asks about antiviral medicine. Encouraged patient to treat symptomatically with her not having significant underlying comorbidities. Of course seek emergent help as needed.

## 2022-08-10 ENCOUNTER — OFFICE VISIT (OUTPATIENT)
Dept: FAMILY MEDICINE CLINIC | Facility: CLINIC | Age: 57
End: 2022-08-10
Payer: COMMERCIAL

## 2022-08-10 ENCOUNTER — NURSE TRIAGE (OUTPATIENT)
Dept: OTHER | Facility: CLINIC | Age: 57
End: 2022-08-10

## 2022-08-10 VITALS
RESPIRATION RATE: 16 BRPM | OXYGEN SATURATION: 91 % | DIASTOLIC BLOOD PRESSURE: 100 MMHG | HEIGHT: 67 IN | WEIGHT: 114.5 LBS | SYSTOLIC BLOOD PRESSURE: 118 MMHG | BODY MASS INDEX: 17.97 KG/M2 | HEART RATE: 80 BPM | TEMPERATURE: 97.9 F

## 2022-08-10 DIAGNOSIS — U07.1 COVID-19: Primary | ICD-10-CM

## 2022-08-10 DIAGNOSIS — N95.1 MENOPAUSAL SYMPTOM: ICD-10-CM

## 2022-08-10 DIAGNOSIS — E78.2 MIXED HYPERLIPIDEMIA: ICD-10-CM

## 2022-08-10 PROCEDURE — 99213 OFFICE O/P EST LOW 20 MIN: CPT | Performed by: FAMILY MEDICINE

## 2022-08-10 RX ORDER — LEVOFLOXACIN 750 MG/1
TABLET ORAL
COMMUNITY
Start: 2022-08-08

## 2022-08-10 RX ORDER — ESTRADIOL 1 MG/1
1 TABLET ORAL DAILY
Qty: 90 TABLET | Refills: 3 | Status: SHIPPED | OUTPATIENT
Start: 2022-08-10

## 2022-08-10 RX ORDER — SIMVASTATIN 10 MG
10 TABLET ORAL NIGHTLY
Qty: 90 TABLET | Refills: 3 | Status: SHIPPED | OUTPATIENT
Start: 2022-08-10

## 2022-08-10 RX ORDER — HYDROCODONE BITARTRATE AND HOMATROPINE METHYLBROMIDE ORAL SOLUTION 5; 1.5 MG/5ML; MG/5ML
5 LIQUID ORAL 3 TIMES DAILY PRN
Qty: 120 ML | Refills: 0 | Status: SHIPPED | OUTPATIENT
Start: 2022-08-10 | End: 2022-08-15

## 2022-08-10 SDOH — ECONOMIC STABILITY: FOOD INSECURITY: WITHIN THE PAST 12 MONTHS, YOU WORRIED THAT YOUR FOOD WOULD RUN OUT BEFORE YOU GOT MONEY TO BUY MORE.: NEVER TRUE

## 2022-08-10 SDOH — ECONOMIC STABILITY: FOOD INSECURITY: WITHIN THE PAST 12 MONTHS, THE FOOD YOU BOUGHT JUST DIDN'T LAST AND YOU DIDN'T HAVE MONEY TO GET MORE.: NEVER TRUE

## 2022-08-10 ASSESSMENT — ENCOUNTER SYMPTOMS
SHORTNESS OF BREATH: 1
COUGH: 1
STRIDOR: 0
WHEEZING: 0
GASTROINTESTINAL NEGATIVE: 1

## 2022-08-10 ASSESSMENT — PATIENT HEALTH QUESTIONNAIRE - PHQ9
1. LITTLE INTEREST OR PLEASURE IN DOING THINGS: 0
SUM OF ALL RESPONSES TO PHQ QUESTIONS 1-9: 0
2. FEELING DOWN, DEPRESSED OR HOPELESS: 0
SUM OF ALL RESPONSES TO PHQ9 QUESTIONS 1 & 2: 0
SUM OF ALL RESPONSES TO PHQ QUESTIONS 1-9: 0

## 2022-08-10 ASSESSMENT — SOCIAL DETERMINANTS OF HEALTH (SDOH): HOW HARD IS IT FOR YOU TO PAY FOR THE VERY BASICS LIKE FOOD, HOUSING, MEDICAL CARE, AND HEATING?: NOT HARD AT ALL

## 2022-08-10 NOTE — PROGRESS NOTES
Shanika Rush (:  1965) is a 62 y.o. female,Established patient, here for evaluation of the following chief complaint(s):  Post-COVID Symptoms (Sx started on , diarrhea, felt some what better Friday and then Saturday patient had awful UTI, still has sinus congestion, green phlegm, dizzy, light headed, can not sleep,  not eating )         ASSESSMENT/PLAN:  1. COVID-19  -     HYDROcodone homatropine (HYCODAN) 5-1.5 MG/5ML solution; Take 5 mLs by mouth 3 times daily as needed (cough) for up to 5 days. , Disp-120 mL, R-0Normal  2. Mixed hyperlipidemia  -     simvastatin (ZOCOR) 10 MG tablet; Take 1 tablet by mouth nightly, Disp-90 tablet, R-3Normal  3  Plan:  Continue all medications from urgent care. Use Hycodan as needed for cough. Monitor oxygen levels at home. If gets below 90% and stayed there then go to the emergency department. Stop smoking. Follow-up as needed. Subjective   SUBJECTIVE/OBJECTIVE:  HPI  79-year-old female with underlying hyperlipidemia and smoking. She is here for sequelae of COVID-19. She tested positive for COVID on 2022. Tried to start Paxilovid but could not tolerate due to diarrhea. Seen at urgent care on 2022 and was given albuterol inhaler and Zofran. Then seen at urgent care on 2022 diagnosed with UTI (thought likely to be secondary to profuse diarrhea. She took a home COVID test and was negative this morning she tried to go back to work today but only made it a couple hours before she was so exhausted she was sent home. Of note, she has lost 6 pounds down to 114 in the last 2 weeks. No longer having diarrhea. She does feel weak, coughing, short of breath with exertion. When she rests she is able to get her breathing back. She is not drinking enough fluids. Still smoking some. Review of Systems   Constitutional:  Positive for activity change, appetite change, chills, fatigue and unexpected weight change.  Negative for

## 2022-08-10 NOTE — LETTER
[facility]  [address] [address]  Phone: [phone]  Fax: [phone]    Michael Oconnor MD, MD        August 10, 2022     Patient: Candelaria Best   YOB: 1965   Date of Visit: 8/10/2022       To Whom It May Concern: It is my medical opinion that Cristopher Caro needs to be excused from work from 8/10/22 though 8/13/22 due to her illness. If you have any questions or concerns, please don't hesitate to call.     Sincerely,        Michael Oconnor MD, MD

## 2022-08-10 NOTE — TELEPHONE ENCOUNTER
Received call from [facility] with Red Flag Complaint. Subjective: Caller states \"10 days ago I tested + for COVID. I went back to work today but left because I was having dizziness and SOB. I also was seen on Monday for a UTI and have been on abx for that. \"     Current Symptoms: Reports dizziness with exertion, SOB with exertion, tightness in chest since having COVID, minor congestion, recent UTI related to diarrhea and given abx, reduced strength, intermittent palpitations, reduced appetite, trouble sleeping, increased water intake. Denies cough, sore throat, CP, SOB at rest, unilateral weakness, blood in stool, melena, hematuria, vomiting, diarrhea now, LOC, visual disturbances,     Onset: 1 day ago; worsening    Associated Symptoms: NA    Pain Severity: denies    Temperature: denies    What has been tried: abx for UTI    LMP: NA Pregnant: NA    Recommended disposition: Go to ED/UCC Now (Or to Office with PCP Approval)    Care advice provided, patient verbalizes understanding; denies any other questions or concerns; instructed to call back for any new or worsening symptoms. Writer provided warm transfer to [facility] [place] for 2nd level      Attention Provider: Thank you for allowing me to participate in the care of your patient. The patient was connected to triage in response to information provided to the ECC/PSC. Please do not respond through this encounter as the response is not directed to a shared pool.       Reason for Disposition   Extra heart beats OR irregular heart beating (i.e., 'palpitations')    Protocols used: Dizziness-ADULT-[place]

## 2022-08-12 ENCOUNTER — NURSE TRIAGE (OUTPATIENT)
Dept: OTHER | Facility: CLINIC | Age: 57
End: 2022-08-12

## 2022-08-12 NOTE — TELEPHONE ENCOUNTER
Received call from [facility] with [employer]. Subjective: Caller states \"I want to make sure I am cleared to go back to work on Monday. \"     States saw Dr. Nora Dasilva on 8/10/22 for shortness of breath     States symptoms have improved since visit on 8/10/22    Current Symptoms: cough, intermittent wheezing    Speaking in complete sentences, speech is clear    Denies - blue lips / heart palpitations    Onset: 2 weeks ago;         Pain Severity: 0/10; Temperature: denies     What has been tried: \"cough syrup\", inhaler        Recommended disposition: See in Office Today    Care advice provided, patient verbalizes understanding; denies any other questions or concerns; instructed to call back for any new or worsening symptoms. Patient/Caller agrees with recommended disposition; writer provided warm transfer to [facility] for appointment scheduling     Attention Provider: Thank you for allowing me to participate in the care of your patient. The patient was connected to triage in response to information provided to the ECC/PSC. Please do not respond through this encounter as the response is not directed to a shared pool.           Reason for Disposition   Patient wants to be seen    Protocols used: Breathing Difficulty-ADULT-[place]

## 2022-08-16 ENCOUNTER — OFFICE VISIT (OUTPATIENT)
Dept: FAMILY MEDICINE CLINIC | Facility: CLINIC | Age: 57
End: 2022-08-16
Payer: COMMERCIAL

## 2022-08-16 VITALS
DIASTOLIC BLOOD PRESSURE: 80 MMHG | BODY MASS INDEX: 17.99 KG/M2 | TEMPERATURE: 97.1 F | SYSTOLIC BLOOD PRESSURE: 126 MMHG | OXYGEN SATURATION: 98 % | HEART RATE: 71 BPM | WEIGHT: 114.6 LBS | HEIGHT: 67 IN | RESPIRATION RATE: 18 BRPM

## 2022-08-16 DIAGNOSIS — U09.9 POST COVID-19 CONDITION, UNSPECIFIED: Primary | ICD-10-CM

## 2022-08-16 DIAGNOSIS — U07.1 COVID-19: ICD-10-CM

## 2022-08-16 DIAGNOSIS — U09.9 POST COVID-19 CONDITION, UNSPECIFIED: ICD-10-CM

## 2022-08-16 PROCEDURE — 71046 X-RAY EXAM CHEST 2 VIEWS: CPT | Performed by: FAMILY MEDICINE

## 2022-08-16 PROCEDURE — 99214 OFFICE O/P EST MOD 30 MIN: CPT | Performed by: FAMILY MEDICINE

## 2022-08-16 ASSESSMENT — ENCOUNTER SYMPTOMS
WHEEZING: 0
CHEST TIGHTNESS: 0
COUGH: 0
GASTROINTESTINAL NEGATIVE: 1
SHORTNESS OF BREATH: 0
CHOKING: 0

## 2022-08-16 ASSESSMENT — PATIENT HEALTH QUESTIONNAIRE - PHQ9
SUM OF ALL RESPONSES TO PHQ QUESTIONS 1-9: 0
1. LITTLE INTEREST OR PLEASURE IN DOING THINGS: 0
SUM OF ALL RESPONSES TO PHQ9 QUESTIONS 1 & 2: 0
SUM OF ALL RESPONSES TO PHQ QUESTIONS 1-9: 0
SUM OF ALL RESPONSES TO PHQ QUESTIONS 1-9: 0
2. FEELING DOWN, DEPRESSED OR HOPELESS: 0
SUM OF ALL RESPONSES TO PHQ QUESTIONS 1-9: 0

## 2022-08-16 NOTE — LETTER
[facility] [address] [facility] [address]  Phone: [phone]  Fax: [phone]    Darío Bassett MD,         August 16, 2022     Patient: Lina Paul   YOB: 1965   Date of Visit: 8/16/2022       To Whom It May Concern: It is my medical opinion that Ana Shaffer may return to work on 8/17/22. Please excuse her from 7/31/22 through 8/16/22 for her condition. If you have any questions or concerns, please don't hesitate to call.     Sincerely,        Darío Bassett MD,

## 2022-08-16 NOTE — PROGRESS NOTES
Shanika Rush (:  1965) is a 62 y.o. female,Established patient, here for evaluation of the following chief complaint(s):  Shortness of Breath (Recheck after covid that pt had on 22 and was out of work. Needs to go back to work)         ASSESSMENT/PLAN:  1. Post covid-19 condition, unspecified  -     XR CHEST PA LAT (2 VIEWS); Future    Chest x-ray negative for acute processes. Appears to show enlarged lungs due to COPD. Official read pending. Plan:  Okay to return to work on 2022. Information given on resources for quitting smoking. Follow-up at next scheduled visit. No follow-ups on file. Subjective   SUBJECTIVE/OBJECTIVE:  HPI  25-year-old female with underlying hyperlipidemia and smoking. Here for slow recovery from COVID-19. Patient tested positive for COVID on 2022  Could not tolerate Paxlovid. Seen in urgent care on 2022 given albuterol and Zofran. Try to go back to work on 8/10/2022 but could not due to shortness of breath and exhaustion. Over the last 6 days she has been slowly feeling better. Diarrhea has resolved. Her appetite is improving. No longer having shortness of breath. She is desiring to quit smoking. Review of Systems   Constitutional:  Positive for fatigue. Negative for activity change, appetite change, chills, diaphoresis and fever. Respiratory:  Negative for cough, choking, chest tightness, shortness of breath and wheezing. Cardiovascular: Negative. Gastrointestinal: Negative. Neurological:  Negative for headaches. Objective   Physical Exam  Vitals and nursing note reviewed. Constitutional:       General: She is not in acute distress. Appearance: She is not ill-appearing or toxic-appearing. Cardiovascular:      Rate and Rhythm: Normal rate and regular rhythm. Pulmonary:      Effort: Pulmonary effort is normal. No respiratory distress. Breath sounds: Normal breath sounds. No wheezing. Neurological:      General: No focal deficit present. Mental Status: She is alert and oriented to person, place, and time. Psychiatric:         Mood and Affect: Mood normal.         Behavior: Behavior normal.         Thought Content: Thought content normal.         Judgment: Judgment normal.        Vitals:    08/16/22 1552   BP: 126/80   Pulse: 71   Resp: 18   Temp: 97.1 °F (36.2 °C)   SpO2: 98%       On this date 8/16/2022 I have spent 30 minutes reviewing previous notes, test results and face to face with the patient discussing the diagnosis and importance of compliance with the treatment plan as well as documenting on the day of the visit. An electronic signature was used to authenticate this note.     --Yany Howe MD, MD

## 2022-08-17 ENCOUNTER — TELEPHONE (OUTPATIENT)
Dept: FAMILY MEDICINE CLINIC | Facility: CLINIC | Age: 57
End: 2022-08-17

## 2022-08-17 NOTE — TELEPHONE ENCOUNTER
Patient was seen in office yesterday and gave Dr. Radha Renteria form to be completed. Form completed and signed. Per Dr. Dameon Walker request, form is faxed with office notes from 8/10/22 and 8/16/22 office visits to the [facility] @ [phone]. Confirmation received and placed in scan tray. Called patient to let her know if was ready.

## 2022-08-19 ENCOUNTER — TELEPHONE (OUTPATIENT)
Dept: FAMILY MEDICINE CLINIC | Facility: CLINIC | Age: 57
End: 2022-08-19

## 2022-08-19 NOTE — TELEPHONE ENCOUNTER
Patient called several times about her FMLA forms those forms were completed and faxed back to Eliane Calderon called to inform patient that forms have been completed and faxed an she could come by the office to  a copy of these forms, she then became very rude with conor. She called today asking if we could e-mail or upload these forms to my chart after I informed patient that because of HIPPA we couldn't e-mail these to her but she could come by the office or she is welcome to wait until the forms were processed in to our scanning dept and scanned in to her media so that we could upload these to my chart, she then became very up rate and rude and being very loud not letting me speak patient has a history of this mistreatment of staff at this office.  Please advise

## 2022-08-20 NOTE — TELEPHONE ENCOUNTER
Paxton,  If you could please call her; explain that we are concerned with her treatment of the staff and that we cannot tolerate this going forward. We can keep her as a patient for now; but overall my fuse is very short for patients that abuse our staff. We have too many patients and work too hard to tolerate this abuse.

## (undated) DEVICE — SOLUTION IV 1000ML 0.9% SOD CHL

## (undated) DEVICE — 2000CC GUARDIAN II: Brand: GUARDIAN

## (undated) DEVICE — (D)PREP SKN CHLRAPRP APPL 26ML -- CONVERT TO ITEM 371833

## (undated) DEVICE — SPONGE LAP 18X18IN STRL -- 5/PK

## (undated) DEVICE — DRAPE TWL SURG 16X26IN BLU ORB04] ALLCARE INC]

## (undated) DEVICE — REM POLYHESIVE ADULT PATIENT RETURN ELECTRODE: Brand: VALLEYLAB

## (undated) DEVICE — BUTTON SWITCH PENCIL BLADE ELECTRODE, HOLSTER: Brand: EDGE

## (undated) DEVICE — INTENDED FOR TISSUE SEPARATION, AND OTHER PROCEDURES THAT REQUIRE A SHARP SURGICAL BLADE TO PUNCTURE OR CUT.: Brand: BARD-PARKER SAFETY BLADES SIZE 15, STERILE

## (undated) DEVICE — NEEDLE HYPO 21GA L1IN GRN S STL HUB POLYPR SHLD REG BVL